# Patient Record
Sex: FEMALE | Race: BLACK OR AFRICAN AMERICAN | Employment: FULL TIME | URBAN - METROPOLITAN AREA
[De-identification: names, ages, dates, MRNs, and addresses within clinical notes are randomized per-mention and may not be internally consistent; named-entity substitution may affect disease eponyms.]

---

## 2021-07-10 ENCOUNTER — HOSPITAL ENCOUNTER (EMERGENCY)
Age: 26
Discharge: HOME | End: 2021-07-10
Attending: EMERGENCY MEDICINE
Payer: COMMERCIAL

## 2021-07-10 ENCOUNTER — APPOINTMENT (OUTPATIENT)
Dept: GENERAL RADIOLOGY | Age: 26
End: 2021-07-10
Payer: COMMERCIAL

## 2021-07-10 VITALS
SYSTOLIC BLOOD PRESSURE: 127 MMHG | RESPIRATION RATE: 20 BRPM | HEART RATE: 74 BPM | HEIGHT: 65 IN | BODY MASS INDEX: 25.16 KG/M2 | WEIGHT: 151 LBS | TEMPERATURE: 97.4 F | DIASTOLIC BLOOD PRESSURE: 80 MMHG | OXYGEN SATURATION: 100 %

## 2021-07-10 DIAGNOSIS — R11.2 NON-INTRACTABLE VOMITING WITH NAUSEA, UNSPECIFIED VOMITING TYPE: ICD-10-CM

## 2021-07-10 DIAGNOSIS — R07.89 CHEST WALL PAIN: Primary | ICD-10-CM

## 2021-07-10 LAB — HCG(URINE) PREGNANCY TEST: NEGATIVE

## 2021-07-10 PROCEDURE — 93005 ELECTROCARDIOGRAM TRACING: CPT | Performed by: EMERGENCY MEDICINE

## 2021-07-10 PROCEDURE — 84703 CHORIONIC GONADOTROPIN ASSAY: CPT

## 2021-07-10 PROCEDURE — 71046 X-RAY EXAM CHEST 2 VIEWS: CPT

## 2021-07-10 PROCEDURE — 6370000000 HC RX 637 (ALT 250 FOR IP): Performed by: STUDENT IN AN ORGANIZED HEALTH CARE EDUCATION/TRAINING PROGRAM

## 2021-07-10 PROCEDURE — 6360000002 HC RX W HCPCS

## 2021-07-10 PROCEDURE — 99285 EMERGENCY DEPT VISIT HI MDM: CPT

## 2021-07-10 PROCEDURE — 96374 THER/PROPH/DIAG INJ IV PUSH: CPT

## 2021-07-10 RX ORDER — KETOROLAC TROMETHAMINE 30 MG/ML
30 INJECTION, SOLUTION INTRAMUSCULAR; INTRAVENOUS ONCE
Status: COMPLETED | OUTPATIENT
Start: 2021-07-10 | End: 2021-07-10

## 2021-07-10 RX ORDER — NAPROXEN 250 MG/1
250 TABLET ORAL 2 TIMES DAILY WITH MEALS
Qty: 20 TABLET | Refills: 0 | Status: SHIPPED | OUTPATIENT
Start: 2021-07-10

## 2021-07-10 RX ORDER — ONDANSETRON 4 MG/1
4 TABLET, ORALLY DISINTEGRATING ORAL EVERY 8 HOURS PRN
Qty: 12 TABLET | Refills: 0 | Status: SHIPPED | OUTPATIENT
Start: 2021-07-10 | End: 2021-07-13 | Stop reason: ALTCHOICE

## 2021-07-10 RX ORDER — KETOROLAC TROMETHAMINE 30 MG/ML
INJECTION, SOLUTION INTRAMUSCULAR; INTRAVENOUS
Status: COMPLETED
Start: 2021-07-10 | End: 2021-07-10

## 2021-07-10 RX ORDER — ONDANSETRON 4 MG/1
4 TABLET, ORALLY DISINTEGRATING ORAL ONCE
Status: COMPLETED | OUTPATIENT
Start: 2021-07-10 | End: 2021-07-10

## 2021-07-10 RX ADMIN — ONDANSETRON 4 MG: 4 TABLET, ORALLY DISINTEGRATING ORAL at 10:51

## 2021-07-10 RX ADMIN — KETOROLAC TROMETHAMINE 30 MG: 30 INJECTION, SOLUTION INTRAMUSCULAR; INTRAVENOUS at 10:51

## 2021-07-10 ASSESSMENT — PAIN DESCRIPTION - LOCATION: LOCATION: CHEST

## 2021-07-10 ASSESSMENT — PAIN DESCRIPTION - ORIENTATION: ORIENTATION: LEFT

## 2021-07-10 ASSESSMENT — PAIN SCALES - GENERAL
PAINLEVEL_OUTOF10: 9
PAINLEVEL_OUTOF10: 4

## 2021-07-10 ASSESSMENT — PAIN DESCRIPTION - PAIN TYPE: TYPE: ACUTE PAIN

## 2021-07-10 ASSESSMENT — PAIN DESCRIPTION - DESCRIPTORS: DESCRIPTORS: SHARP

## 2021-07-10 NOTE — ED PROVIDER NOTES
I have personally performed a face to face diagnostic evaluation on this patient. I have fully participated in the care of this patient. I have reviewed and agree with all pertinent clinical information including history, physical exam, diagnostic tests, and the plan. This patient was seen with resident physician Dr. Pamella Morley. History and Physical as follows:  61-year-old female states she woke up and while brushing her teeth had some nausea with vomiting. She states she had a total of about 6 episodes. Despite her symptoms she went to work and stated she vomited in route. While there she developed some left-sided chest pain described as worse with change in position and with breathing. No cough or URI symptoms. She states she had some diarrhea at work. No blood or bile in her emesis. Denies alcohol use last night but did smoke from a hookah. She called 911 from work and was brought here by [employer] who reported that she had stable vital signs. Patient has no previous history of abdominal surgery. Her last menstrual period was 7 days ago. No known risk factors for PE. She is afebrile with normal vital signs. Lungs are clear bilaterally. Heart is regular rate and rhythm without murmur gallop. She has left-sided chest wall tenderness which exactly mimics her pain. This is also mimicked by truncal movement. Abdomen is soft and nontender with normal bowel sounds. EKG read by myself shows normal sinus rhythm at a rate of 64. There is some baseline artifact. Axis is 62. No ischemia. No ectopy. QTC is 404. Patient was medicated with Toradol IM. She declines any medication at this time for nausea/diarrhea. UCG was negative. Chest x-ray read by the radiologist and reviewed by myself shows no acute cardiopulmonary findings. Clinically the patient's chest pain is musculoskeletal.  No evidence of pneumothorax, pneumonia, dissection or DVT/PE. Her abdomen is benign.   No clinical evidence for surgical abdomen, bowel obstruction, pancreatitis, pyelonephritis or ureteral colic. Recommended Zofran if needed for nausea/vomiting and Naprosyn if needed for chest pain. She was given a note for work today.     DX: 1) chest wall pain         2) nausea with vomiting          Sheryle Scotland, MD  07/10/21 7198

## 2021-07-10 NOTE — ED PROVIDER NOTES
Emergency Physician Note    Chief Complaint  Abdominal Pain (LUQ ) and Chest Pain (left upper with NV )       History of Present Illness  Briana Osuna is a 27 y.o. female who presents to the ED for L chest pain and NV with onset 5 hours ago. Pt states she was at work this morning when she called for an ambulance because of multiple episodes of nausea and vomiting. States she felt well until waking up this morning. Reports she went out with friends last night and smoked hookah, did NOT drink any EtOH. Felt fine when she went to sleep around 230am.  Woke to go to work and had nausea followed by vomiting, 6 total episodes of emesis, last of which was 3 hours ago. Patient is also experiencing pleuritic chest pain which began after vomiting today. She gets some relief when laying down and chest pain is worse with movement. Describes non radiating sharp, needle like left sided chest pain. Pain is reproducible to palpation. Also reports a couple episodes of diarrhea this morning, which have since resolved. Denies dysuria, fevers, history of similar    LNMP was 7 days ago    No known risk factors for PE    10 systems reviewed, pertinent positives per HPI otherwise noted to be negative      Nursing notes reviewed. ED Triage Vitals   Enc Vitals Group      BP       Pulse       Resp       Temp       Temp src       SpO2       Weight       Height       Head Circumference       Peak Flow       Pain Score       Pain Loc       Pain Edu? Excl. in [address]? GENERAL:  Awake, alert. Well developed, well nourished with no apparent distress. HENT:  Normocephalic, Atraumatic, moist mucous membranes. EYES:  Pupils equal round and reactive to light, Conjunctiva normal, extraocular movements normal.  NECK:  No meningeal signs, Supple. CHEST:  Regular rate and rhythm, chest wall point tenderness on Left T4 mid clavicular line. LUNGS:  Clear to auscultation bilaterally.   ABDOMEN:  Soft, non-tender, no rebound, rigidity or guarding, non-distended, normal bowel sounds. No costovertebral angle tenderness to palpation. BACK:  No tenderness. EXTREMITIES:  Normal range of motion, no edema, no bony tenderness, no deformity, distal pulses present. SKIN: Warm, dry and intact. NEUROLOGIC: Normal mental status. Moving all extremities to command. LABS and DIAGNOSTIC RESULTS  EKG  normal sinus rhythm at a rate of 64. There is some baseline artifact. Axis is 62. No ischemia. No ectopy. QTC is 404    RADIOLOGY  X-RAYS:  I have reviewed radiologic plain film image(s). ALL OTHER NON-PLAIN FILM IMAGES SUCH AS CT, ULTRASOUND AND MRI HAVE BEEN READ BY THE RADIOLOGIST. XR CHEST (2 VW)   Final Result      No acute cardiopulmonary findings. LABS  Labs Reviewed   PREGNANCY, URINE    Narrative:     Performed at:  [facility]  [address] [address],  [address]   Phone [phone]       [facility]        . I advised the patient to return to the emergency department immediately for any new or worsening symptoms, such as intractable nausea and vomiting, SOB, chest pain on exertion. The patient voiced agreement and understanding of the treatment plan. Patient was given scripts for the following medications. I counseled patient how to take these medications. Discharge Medication List as of 7/10/2021 11:40 AM      START taking these medications    Details   ondansetron (ZOFRAN ODT) 4 MG disintegrating tablet Place 1 tablet under the tongue every 8 hours as needed for Nausea or Vomiting, Disp-12 tablet, R-0Print      naproxen (NAPROSYN) 250 MG tablet Take 1 tablet by mouth 2 times daily (with meals), Disp-20 tablet, R-0Print             Disposition  Pt is in good condition upon Discharge to home. Clinical Impression  1. Chest wall pain    2.  Non-intractable vomiting with nausea, unspecified vomiting type

## 2021-07-11 LAB
EKG ATRIAL RATE: 59 BPM
EKG DIAGNOSIS: NORMAL
EKG P AXIS: 29 DEGREES
EKG P-R INTERVAL: 198 MS
EKG Q-T INTERVAL: 388 MS
EKG QRS DURATION: 78 MS
EKG QTC CALCULATION (BAZETT): 384 MS
EKG R AXIS: 61 DEGREES
EKG T AXIS: 49 DEGREES
EKG VENTRICULAR RATE: 59 BPM

## 2021-07-11 PROCEDURE — 93010 ELECTROCARDIOGRAM REPORT: CPT | Performed by: INTERNAL MEDICINE

## 2021-07-13 ENCOUNTER — HOSPITAL ENCOUNTER (EMERGENCY)
Age: 26
Discharge: HOME | End: 2021-07-13
Attending: EMERGENCY MEDICINE
Payer: COMMERCIAL

## 2021-07-13 ENCOUNTER — APPOINTMENT (OUTPATIENT)
Dept: CT IMAGING | Age: 26
End: 2021-07-13
Payer: COMMERCIAL

## 2021-07-13 VITALS
RESPIRATION RATE: 20 BRPM | OXYGEN SATURATION: 100 % | TEMPERATURE: 98.5 F | SYSTOLIC BLOOD PRESSURE: 108 MMHG | WEIGHT: 143 LBS | DIASTOLIC BLOOD PRESSURE: 70 MMHG | HEIGHT: 65 IN | HEART RATE: 85 BPM | BODY MASS INDEX: 23.82 KG/M2

## 2021-07-13 DIAGNOSIS — K52.9 ENTERITIS: Primary | ICD-10-CM

## 2021-07-13 DIAGNOSIS — N30.00 ACUTE CYSTITIS WITHOUT HEMATURIA: ICD-10-CM

## 2021-07-13 LAB
A/G RATIO: 1.1 (ref 1.1–2.2)
ALBUMIN SERPL-MCNC: 4.6 G/DL (ref 3.4–5)
ALP BLD-CCNC: 72 U/L (ref 40–129)
ALT SERPL-CCNC: 16 U/L (ref 10–40)
ANION GAP SERPL CALCULATED.3IONS-SCNC: 14 MMOL/L (ref 3–16)
AST SERPL-CCNC: 20 U/L (ref 15–37)
BACTERIA: ABNORMAL /HPF
BASOPHILS ABSOLUTE: 0 K/UL (ref 0–0.2)
BASOPHILS RELATIVE PERCENT: 1.1 %
BILIRUB SERPL-MCNC: 0.5 MG/DL (ref 0–1)
BILIRUBIN URINE: NEGATIVE
BLOOD, URINE: ABNORMAL
BUN BLDV-MCNC: 9 MG/DL (ref 7–20)
CALCIUM SERPL-MCNC: 9.3 MG/DL (ref 8.3–10.6)
CHLORIDE BLD-SCNC: 100 MMOL/L (ref 99–110)
CLARITY: ABNORMAL
CO2: 26 MMOL/L (ref 21–32)
COLOR: YELLOW
CREAT SERPL-MCNC: 0.8 MG/DL (ref 0.6–1.1)
EOSINOPHILS ABSOLUTE: 0 K/UL (ref 0–0.6)
EOSINOPHILS RELATIVE PERCENT: 1 %
EPITHELIAL CELLS, UA: ABNORMAL /HPF (ref 0–5)
GFR AFRICAN AMERICAN: >60
GFR NON-AFRICAN AMERICAN: >60
GLOBULIN: 4.1 G/DL
GLUCOSE BLD-MCNC: 98 MG/DL (ref 70–99)
GLUCOSE URINE: NEGATIVE MG/DL
HCG(URINE) PREGNANCY TEST: NEGATIVE
HCT VFR BLD CALC: 39.6 % (ref 36–48)
HEMOGLOBIN: 13.1 G/DL (ref 12–16)
KETONES, URINE: NEGATIVE MG/DL
LEUKOCYTE ESTERASE, URINE: ABNORMAL
LIPASE: 22 U/L (ref 13–60)
LYMPHOCYTES ABSOLUTE: 1.1 K/UL (ref 1–5.1)
LYMPHOCYTES RELATIVE PERCENT: 27.2 %
MCH RBC QN AUTO: 28.9 PG (ref 26–34)
MCHC RBC AUTO-ENTMCNC: 33.1 G/DL (ref 31–36)
MCV RBC AUTO: 87.2 FL (ref 80–100)
MICROSCOPIC EXAMINATION: YES
MONOCYTES ABSOLUTE: 0.7 K/UL (ref 0–1.3)
MONOCYTES RELATIVE PERCENT: 17 %
NEUTROPHILS ABSOLUTE: 2.2 K/UL (ref 1.7–7.7)
NEUTROPHILS RELATIVE PERCENT: 53.7 %
NITRITE, URINE: POSITIVE
PDW BLD-RTO: 14.4 % (ref 12.4–15.4)
PH UA: 6.5 (ref 5–8)
PLATELET # BLD: 278 K/UL (ref 135–450)
PMV BLD AUTO: 8.3 FL (ref 5–10.5)
POTASSIUM REFLEX MAGNESIUM: 3.6 MMOL/L (ref 3.5–5.1)
PROTEIN UA: 30 MG/DL
RBC # BLD: 4.55 M/UL (ref 4–5.2)
RBC UA: ABNORMAL /HPF (ref 0–4)
SODIUM BLD-SCNC: 140 MMOL/L (ref 136–145)
SPECIFIC GRAVITY UA: <=1.005 (ref 1–1.03)
TOTAL PROTEIN: 8.7 G/DL (ref 6.4–8.2)
URINE REFLEX TO CULTURE: ABNORMAL
URINE TYPE: ABNORMAL
UROBILINOGEN, URINE: 0.2 E.U./DL
WBC # BLD: 4.2 K/UL (ref 4–11)
WBC UA: ABNORMAL /HPF (ref 0–5)

## 2021-07-13 PROCEDURE — 85025 COMPLETE CBC W/AUTO DIFF WBC: CPT

## 2021-07-13 PROCEDURE — 81001 URINALYSIS AUTO W/SCOPE: CPT

## 2021-07-13 PROCEDURE — 6360000004 HC RX CONTRAST MEDICATION: Performed by: EMERGENCY MEDICINE

## 2021-07-13 PROCEDURE — 74177 CT ABD & PELVIS W/CONTRAST: CPT

## 2021-07-13 PROCEDURE — 6360000002 HC RX W HCPCS: Performed by: EMERGENCY MEDICINE

## 2021-07-13 PROCEDURE — 2580000003 HC RX 258: Performed by: EMERGENCY MEDICINE

## 2021-07-13 PROCEDURE — 96374 THER/PROPH/DIAG INJ IV PUSH: CPT

## 2021-07-13 PROCEDURE — 84703 CHORIONIC GONADOTROPIN ASSAY: CPT

## 2021-07-13 PROCEDURE — 6370000000 HC RX 637 (ALT 250 FOR IP): Performed by: EMERGENCY MEDICINE

## 2021-07-13 PROCEDURE — 99283 EMERGENCY DEPT VISIT LOW MDM: CPT

## 2021-07-13 PROCEDURE — 83690 ASSAY OF LIPASE: CPT

## 2021-07-13 PROCEDURE — 80053 COMPREHEN METABOLIC PANEL: CPT

## 2021-07-13 RX ORDER — CIPROFLOXACIN 2 MG/ML
400 INJECTION, SOLUTION INTRAVENOUS ONCE
Status: DISCONTINUED | OUTPATIENT
Start: 2021-07-13 | End: 2021-07-13 | Stop reason: HOSPADM

## 2021-07-13 RX ORDER — METRONIDAZOLE 500 MG/1
500 TABLET ORAL 2 TIMES DAILY
Qty: 20 TABLET | Refills: 0 | Status: SHIPPED | OUTPATIENT
Start: 2021-07-13 | End: 2021-07-23

## 2021-07-13 RX ORDER — ONDANSETRON 2 MG/ML
4 INJECTION INTRAMUSCULAR; INTRAVENOUS ONCE
Status: COMPLETED | OUTPATIENT
Start: 2021-07-13 | End: 2021-07-13

## 2021-07-13 RX ORDER — ONDANSETRON 4 MG/1
4 TABLET, ORALLY DISINTEGRATING ORAL EVERY 8 HOURS PRN
Qty: 10 TABLET | Refills: 0 | Status: SHIPPED | OUTPATIENT
Start: 2021-07-13

## 2021-07-13 RX ORDER — METRONIDAZOLE 500 MG/1
500 TABLET ORAL ONCE
Status: COMPLETED | OUTPATIENT
Start: 2021-07-13 | End: 2021-07-13

## 2021-07-13 RX ORDER — 0.9 % SODIUM CHLORIDE 0.9 %
1000 INTRAVENOUS SOLUTION INTRAVENOUS ONCE
Status: COMPLETED | OUTPATIENT
Start: 2021-07-13 | End: 2021-07-13

## 2021-07-13 RX ORDER — DICYCLOMINE HYDROCHLORIDE 10 MG/1
10 CAPSULE ORAL EVERY 6 HOURS PRN
Qty: 20 CAPSULE | Refills: 0 | Status: SHIPPED | OUTPATIENT
Start: 2021-07-13

## 2021-07-13 RX ORDER — CIPROFLOXACIN 500 MG/1
500 TABLET, FILM COATED ORAL 2 TIMES DAILY
Qty: 20 TABLET | Refills: 0 | Status: SHIPPED | OUTPATIENT
Start: 2021-07-13 | End: 2021-07-23

## 2021-07-13 RX ORDER — CIPROFLOXACIN 500 MG/1
500 TABLET, FILM COATED ORAL EVERY 12 HOURS SCHEDULED
Status: DISCONTINUED | OUTPATIENT
Start: 2021-07-13 | End: 2021-07-13 | Stop reason: HOSPADM

## 2021-07-13 RX ORDER — ONDANSETRON 2 MG/ML
4 INJECTION INTRAMUSCULAR; INTRAVENOUS ONCE
Status: DISCONTINUED | OUTPATIENT
Start: 2021-07-13 | End: 2021-07-13 | Stop reason: HOSPADM

## 2021-07-13 RX ORDER — 0.9 % SODIUM CHLORIDE 0.9 %
1000 INTRAVENOUS SOLUTION INTRAVENOUS ONCE
Status: DISCONTINUED | OUTPATIENT
Start: 2021-07-13 | End: 2021-07-13 | Stop reason: HOSPADM

## 2021-07-13 RX ADMIN — CIPROFLOXACIN HYDROCHLORIDE 500 MG: 500 TABLET, FILM COATED ORAL at 12:55

## 2021-07-13 RX ADMIN — METRONIDAZOLE 500 MG: 500 TABLET ORAL at 12:55

## 2021-07-13 RX ADMIN — SODIUM CHLORIDE 1000 ML: 9 INJECTION, SOLUTION INTRAVENOUS at 10:37

## 2021-07-13 RX ADMIN — IOPAMIDOL 80 ML: 755 INJECTION, SOLUTION INTRAVENOUS at 11:29

## 2021-07-13 RX ADMIN — ONDANSETRON 4 MG: 2 INJECTION INTRAMUSCULAR; INTRAVENOUS at 10:35

## 2021-07-13 ASSESSMENT — ENCOUNTER SYMPTOMS
COLOR CHANGE: 0
VOMITING: 1
SHORTNESS OF BREATH: 0
VOICE CHANGE: 0
DIARRHEA: 1
TROUBLE SWALLOWING: 0
NAUSEA: 1
WHEEZING: 0
STRIDOR: 0
ABDOMINAL PAIN: 1
FACIAL SWELLING: 0

## 2021-07-13 ASSESSMENT — PAIN SCALES - GENERAL: PAINLEVEL_OUTOF10: 5

## 2021-07-13 ASSESSMENT — PAIN DESCRIPTION - DESCRIPTORS: DESCRIPTORS: OTHER (COMMENT)

## 2021-07-13 ASSESSMENT — PAIN DESCRIPTION - ORIENTATION: ORIENTATION: UPPER;LEFT;RIGHT

## 2021-07-13 ASSESSMENT — PAIN DESCRIPTION - LOCATION: LOCATION: ABDOMEN

## 2021-07-13 ASSESSMENT — PAIN DESCRIPTION - PAIN TYPE: TYPE: ACUTE PAIN

## 2021-07-13 NOTE — ED NOTES
Pt states she can take PO antibiotics. Reviewed with Dr. Arben Aguilar. See orders.      Nilam Gibbs RN  07/13/21 3674

## 2021-07-13 NOTE — ED PROVIDER NOTES
[address]  eMERGENCY dEPARTMENT eNCOUnter      Pt Name: Jose Burnette  MRN: [id number]  [place] 1995  Date of evaluation: 7/13/2021  Provider: Forest Rodríguez MD    [address] [place] [address]       Chief Complaint   Patient presents with    Abdominal Pain         HISTORY OF PRESENT ILLNESS   (Location/Symptom, Timing/Onset, Context/Setting, Quality, Duration, Modifying Factors, Severity)  Note limiting factors. Conception Vasile is a 27 y.o. female who presents with nausea, vomiting, diarrhea, and diffuse abdominal pain for the past 3 days. Of note the patient was here 3 days ago with similar symptoms as well as chest pain from vomiting had a negative urine pregnancy test and negative x-ray. Reports her chest pain is completely resolved however her nausea vomiting diarrhea has continued and now she has abdominal pain. She has any vaginal bleeding, vaginal discharge, or dysuria. She reports her chest pain is diffuse, aching, and in all 4 abdominal quadrants and gets worse with eating. She denies any fever. She denies any constipation. HPI    Nursing Notes were reviewed. REVIEW OFSYSTEMS    (2-9 systems for level 4, 10 or more for level 5)     Review of Systems   Constitutional: Negative for appetite change, fever and unexpected weight change. HENT: Negative for facial swelling, trouble swallowing and voice change. Eyes: Negative for visual disturbance. Respiratory: Negative for shortness of breath, wheezing and stridor. Cardiovascular: Negative for chest pain and palpitations. Gastrointestinal: Positive for abdominal pain, diarrhea, nausea and vomiting. Genitourinary: Negative for dysuria and vaginal bleeding. Musculoskeletal: Negative for neck pain and neck stiffness. Skin: Negative for color change and wound. Neurological: Negative for seizures and syncope. Psychiatric/Behavioral: Negative for self-injury and suicidal ideas.        Except as noted above the remainder of the review of systems was reviewed and negative. PAST MEDICAL HISTORY   History reviewed. No pertinent past medical history. SURGICAL HISTORY     History reviewed. No pertinent surgical history. CURRENT MEDICATIONS       Previous Medications    NAPROXEN (NAPROSYN) 250 MG TABLET    Take 1 tablet by mouth 2 times daily (with meals)       ALLERGIES     Patient has no known allergies. FAMILY HISTORY     History reviewed. No pertinent family history. SOCIAL HISTORY       Social History     Socioeconomic History    Marital status: Single     Spouse name: None    Number of children: None    Years of education: None    Highest education level: None   Occupational History    None   Tobacco Use    Smoking status: Never Smoker    Smokeless tobacco: Never Used   Substance and Sexual Activity    Alcohol use: Not Currently    Drug use: Not Currently    Sexual activity: None   Other Topics Concern    None   Social History Narrative    None     Social Determinants of Health     Financial Resource Strain:     Difficulty of Paying Living Expenses:    Food Insecurity:     Worried About Running Out of Food in the Last Year:     Ran Out of Food in the Last Year:    Transportation Needs:     Lack of Transportation (Medical):      Lack of Transportation (Non-Medical):    Physical Activity:     Days of Exercise per Week:     Minutes of Exercise per Session:    Stress:     Feeling of Stress :    Social Connections:     Frequency of Communication with Friends and Family:     Frequency of Social Gatherings with Friends and Family:     Attends Hindu Services:     Active Member of Clubs or Organizations:     Attends Club or Organization Meetings:     Marital Status:    Intimate Partner Violence:     Fear of Current or Ex-Partner:     Emotionally Abused:     Physically Abused:     Sexually Abused:          PHYSICAL EXAM    (up to 7 for level 4, 8 or more for level 5)     ED Triage Vitals [07/13/21 0942]   BP Temp Temp Source Pulse Resp SpO2 Height Weight   106/63 98 °F (36.7 °C) Oral 73 14 98 % 5' 5\" (1.651 m) 143 lb (64.9 kg)       Physical Exam  Vitals and nursing note reviewed. Constitutional:       Appearance: She is well-developed. She is not diaphoretic. Comments: Pleasant and cooperative. Nontoxic-appearing. In no acute distress. HENT:      Head: Normocephalic and atraumatic. Right Ear: External ear normal.      Left Ear: External ear normal.   Eyes:      Conjunctiva/sclera: Conjunctivae normal.   Neck:      Vascular: No JVD. Trachea: No tracheal deviation. Cardiovascular:      Rate and Rhythm: Normal rate. Pulmonary:      Effort: Pulmonary effort is normal. No respiratory distress. Breath sounds: Normal breath sounds. No wheezing. Abdominal:      General: There is no distension. Palpations: Abdomen is soft. Tenderness: There is abdominal tenderness. There is no guarding or rebound. Comments: Mild diffuse tenderness to all 4 abdominal quadrants. No rebound, guarding, peritoneal signs. Musculoskeletal:         General: No tenderness or deformity. Normal range of motion. Cervical back: Neck supple. Skin:     General: Skin is warm and dry. Neurological:      Mental Status: She is alert. Cranial Nerves: No cranial nerve deficit. DIAGNOSTIC RESULTS         RADIOLOGY:     Interpretation per the Radiologist below, if available at the time of this note:    CT ABDOMEN PELVIS W IV CONTRAST Additional Contrast? None   Final Result      1. Nonspecific wall thickening in the terminal ileum which may suggest infectious or inflammatory enteritis. Findings could represent regional enteritis the proper clinical setting. 2. Normal appendix in the right lower quadrant. No acute abdominal or pelvic abnormality otherwise identified.                   ED BEDSIDE ULTRASOUND:   Performed by ED Physician - none    LABS:  Labs Reviewed   COMPREHENSIVE METABOLIC PANEL W/ REFLEX TO MG FOR LOW K - Abnormal; Notable for the following components:       Result Value    Total Protein 8.7 (*)     All other components within normal limits    Narrative:     Performed at:  On license of [facility]  [facility] [address] [facility]   Phone [phone]   URINE RT REFLEX TO CULTURE - Abnormal; Notable for the following components:    Clarity, UA SL CLOUDY (*)     Blood, Urine LARGE (*)     Protein, UA 30 (*)     Nitrite, Urine POSITIVE (*)     Leukocyte Esterase, Urine TRACE (*)     All other components within normal limits    Narrative:     Performed at:  On license of [facility]  [address]  [facility] [place]   Phone [phone]   MICROSCOPIC URINALYSIS - Abnormal; Notable for the following components:    Bacteria, UA 4+ (*)     All other components within normal limits    Narrative:     Performed at:  On license of [facility]  [facility] [facility] [place]   Phone [phone]   CBC WITH AUTO DIFFERENTIAL    Narrative:     Performed at:  On license of [facility]  [facility] [place]   Phone [phone]   LIPASE    Narrative:     Performed at:  [facility]  [address] [facility] [place]   Phone [phone]   PREGNANCY, URINE    Narrative:     Performed at:  On license of [facility]  [facility] [place]   Phone [phone]       All otherlabs were within normal range or not returned as of this dictation.     EMERGENCY DEPARTMENT COURSE and DIFFERENTIAL DIAGNOSIS/MDM:   Vitals:    Vitals:    07/13/21 0942   BP: 106/63   Pulse: 73   Resp: 14   Temp: 98 °F (36.7 °C)   TempSrc: Oral   SpO2: 98%   Weight: 143 lb (64.9 kg)   Height: 5' 5\" (1.651 m)         MDM  Work-up shows concerns of enteritis with inflammation of the terminal ileum as well as acute cystitis. Patient is otherwise unremarkable with no pamela signs of peritonitis, normal laboratory evaluation, and all normal vital signs. She is tolerating p.o. well by time she leaves the emergency department. Feel she is appropriate for antibiotics, Bentyl for pain, and Zofran for nausea as well as gastroenterology referral and primary care referral.  Strict ER return precautions given for new or worsening symptoms. I discussed this plan with the patient and her mother who both expressed understanding and agreement with this plan. The risks vs benefits of treating patient with fluoroquinolones vs other alternative therapies are considered and discussed with the patient. Namely the risks of tendon rupture and peripheral neuropathy are discussed with the patient as well as any concomitant risk factors for these processes. After discussing these risks a shared decision is made with the patient that this is the most appropriate antibiotic for their condition and that we will proceed with this treatment. The patient is advised to avoid high impact exercises for the next 1-2 weeks but is made aware this will reduce but not eliminate the increased risk of tendon rupture. The patient voices understanding of these risks and agreement to proceed with therapy. I estimate there is low risk for acute appendicitis, bowl obstruction, cholecystitis, diverticulitis, incarcerated hernia, mesenteric ischemia, pancreatitis, perforated bowl or ulcer, or abdominal aortic aneurism, thus I consider the discharge disposition reasonable. Also, there is no evidence or peritonitis, sepsis or toxicity. We have discussed the diagnosis and risks and agree with discharging home to follow-up with their primary doctor.  We also discussed returning to the Emergency Department immediately if new or worsening symptoms occur and have discussed the symptoms which are most concerning (e.g., bloody stool, fever, changing or worsening pain, vomiting) that necessitate immediate return. Procedures    FINAL IMPRESSION      1. Enteritis    2. Acute cystitis without hematuria          DISPOSITION/PLAN   DISPOSITION  Discharge      PATIENT REFERRED TO:  Pat Wilkerson MD  [address] [facility], [address]  [phone]    In 1 week  For inflamation of the terminal [facility]  [address] [facility] [address]  [phone]    In 1 week  Ask for appointment with Primary Care Doctor    [address] Emergency Department  [address] [place] [address]  [phone]    If symptoms worsen      DISCHARGE MEDICATIONS:  New Prescriptions    CIPROFLOXACIN (CIPRO) 500 MG TABLET    Take 1 tablet by mouth 2 times daily for 10 days    DICYCLOMINE (BENTYL) 10 MG CAPSULE    Take 1 capsule by mouth every 6 hours as needed (cramps)    METRONIDAZOLE (FLAGYL) 500 MG TABLET    Take 1 tablet by mouth 2 times daily for 10 days    ONDANSETRON (ZOFRAN ODT) 4 MG DISINTEGRATING TABLET    Take 1 tablet by mouth every 8 hours as needed for Nausea          (Please note that portions of this note were completed with a voice recognition program.  Efforts were made to edit the dictations but occasionally words aremis-transcribed. )    Melissa Aguilar MD (electronically signed)  Attending Emergency Physician            Melissa Aguilar MD  07/13/21 3065

## 2022-11-09 ENCOUNTER — HOSPITAL ENCOUNTER (EMERGENCY)
Age: 27
Discharge: HOME | End: 2022-11-09
Payer: COMMERCIAL

## 2022-11-09 VITALS
HEART RATE: 79 BPM | DIASTOLIC BLOOD PRESSURE: 68 MMHG | RESPIRATION RATE: 16 BRPM | OXYGEN SATURATION: 98 % | WEIGHT: 135 LBS | BODY MASS INDEX: 22.47 KG/M2 | TEMPERATURE: 99.6 F | SYSTOLIC BLOOD PRESSURE: 109 MMHG

## 2022-11-09 DIAGNOSIS — R11.2 NAUSEA AND VOMITING, UNSPECIFIED VOMITING TYPE: ICD-10-CM

## 2022-11-09 DIAGNOSIS — U07.1 COVID: Primary | ICD-10-CM

## 2022-11-09 DIAGNOSIS — E86.0 DEHYDRATION: ICD-10-CM

## 2022-11-09 LAB
A/G RATIO: 1.1 (ref 1.1–2.2)
ALBUMIN SERPL-MCNC: 4.3 G/DL (ref 3.4–5)
ALP BLD-CCNC: 62 U/L (ref 40–129)
ALT SERPL-CCNC: 12 U/L (ref 10–40)
ANION GAP SERPL CALCULATED.3IONS-SCNC: 13 MMOL/L (ref 3–16)
AST SERPL-CCNC: 22 U/L (ref 15–37)
BASOPHILS ABSOLUTE: 0 K/UL (ref 0–0.2)
BASOPHILS RELATIVE PERCENT: 0.1 %
BILIRUB SERPL-MCNC: <0.2 MG/DL (ref 0–1)
BILIRUBIN URINE: NEGATIVE
BLOOD, URINE: NEGATIVE
BUN BLDV-MCNC: 5 MG/DL (ref 7–20)
CALCIUM SERPL-MCNC: 9.2 MG/DL (ref 8.3–10.6)
CHLORIDE BLD-SCNC: 100 MMOL/L (ref 99–110)
CLARITY: CLEAR
CO2: 23 MMOL/L (ref 21–32)
COLOR: YELLOW
CREAT SERPL-MCNC: 0.7 MG/DL (ref 0.6–1.1)
EOSINOPHILS ABSOLUTE: 0 K/UL (ref 0–0.6)
EOSINOPHILS RELATIVE PERCENT: 0.5 %
GFR SERPL CREATININE-BSD FRML MDRD: >60 ML/MIN/{1.73_M2}
GLUCOSE BLD-MCNC: 94 MG/DL (ref 70–99)
GLUCOSE URINE: NEGATIVE MG/DL
HCT VFR BLD CALC: 37.1 % (ref 36–48)
HEMOGLOBIN: 12.4 G/DL (ref 12–16)
KETONES, URINE: 40 MG/DL
LEUKOCYTE ESTERASE, URINE: NEGATIVE
LIPASE: 20 U/L (ref 13–60)
LYMPHOCYTES ABSOLUTE: 0.5 K/UL (ref 1–5.1)
LYMPHOCYTES RELATIVE PERCENT: 9.1 %
MCH RBC QN AUTO: 28.9 PG (ref 26–34)
MCHC RBC AUTO-ENTMCNC: 33.5 G/DL (ref 31–36)
MCV RBC AUTO: 86.2 FL (ref 80–100)
MICROSCOPIC EXAMINATION: ABNORMAL
MONOCYTES ABSOLUTE: 0.7 K/UL (ref 0–1.3)
MONOCYTES RELATIVE PERCENT: 13.8 %
NEUTROPHILS ABSOLUTE: 3.8 K/UL (ref 1.7–7.7)
NEUTROPHILS RELATIVE PERCENT: 76.5 %
NITRITE, URINE: NEGATIVE
PDW BLD-RTO: 13 % (ref 12.4–15.4)
PH UA: 6 (ref 5–8)
PLATELET # BLD: 242 K/UL (ref 135–450)
PMV BLD AUTO: 8.3 FL (ref 5–10.5)
POTASSIUM REFLEX MAGNESIUM: 4.3 MMOL/L (ref 3.5–5.1)
PROTEIN UA: NEGATIVE MG/DL
RAPID INFLUENZA  B AGN: NEGATIVE
RAPID INFLUENZA A AGN: NEGATIVE
RBC # BLD: 4.3 M/UL (ref 4–5.2)
SARS-COV-2, NAAT: DETECTED
SODIUM BLD-SCNC: 136 MMOL/L (ref 136–145)
SPECIFIC GRAVITY UA: <=1.005 (ref 1–1.03)
TOTAL PROTEIN: 8.1 G/DL (ref 6.4–8.2)
URINE REFLEX TO CULTURE: ABNORMAL
URINE TYPE: ABNORMAL
UROBILINOGEN, URINE: 0.2 E.U./DL
WBC # BLD: 5 K/UL (ref 4–11)

## 2022-11-09 PROCEDURE — 87804 INFLUENZA ASSAY W/OPTIC: CPT

## 2022-11-09 PROCEDURE — 99284 EMERGENCY DEPT VISIT MOD MDM: CPT

## 2022-11-09 PROCEDURE — 83690 ASSAY OF LIPASE: CPT

## 2022-11-09 PROCEDURE — 87635 SARS-COV-2 COVID-19 AMP PRB: CPT

## 2022-11-09 PROCEDURE — 2580000003 HC RX 258: Performed by: PHYSICIAN ASSISTANT

## 2022-11-09 PROCEDURE — 81003 URINALYSIS AUTO W/O SCOPE: CPT

## 2022-11-09 PROCEDURE — 85025 COMPLETE CBC W/AUTO DIFF WBC: CPT

## 2022-11-09 PROCEDURE — 6360000002 HC RX W HCPCS: Performed by: PHYSICIAN ASSISTANT

## 2022-11-09 PROCEDURE — 80053 COMPREHEN METABOLIC PANEL: CPT

## 2022-11-09 PROCEDURE — 96375 TX/PRO/DX INJ NEW DRUG ADDON: CPT

## 2022-11-09 PROCEDURE — 96374 THER/PROPH/DIAG INJ IV PUSH: CPT

## 2022-11-09 PROCEDURE — 96361 HYDRATE IV INFUSION ADD-ON: CPT

## 2022-11-09 RX ORDER — ONDANSETRON 4 MG/1
4 TABLET, ORALLY DISINTEGRATING ORAL EVERY 8 HOURS PRN
Qty: 24 TABLET | Refills: 0 | Status: SHIPPED | OUTPATIENT
Start: 2022-11-09 | End: 2022-11-16

## 2022-11-09 RX ORDER — IBUPROFEN 800 MG/1
800 TABLET ORAL EVERY 8 HOURS PRN
Qty: 20 TABLET | Refills: 0 | Status: SHIPPED | OUTPATIENT
Start: 2022-11-09 | End: 2022-11-19

## 2022-11-09 RX ORDER — ONDANSETRON 2 MG/ML
4 INJECTION INTRAMUSCULAR; INTRAVENOUS EVERY 30 MIN PRN
Status: DISCONTINUED | OUTPATIENT
Start: 2022-11-09 | End: 2022-11-09 | Stop reason: HOSPADM

## 2022-11-09 RX ORDER — KETOROLAC TROMETHAMINE 15 MG/ML
15 INJECTION, SOLUTION INTRAMUSCULAR; INTRAVENOUS ONCE
Status: COMPLETED | OUTPATIENT
Start: 2022-11-09 | End: 2022-11-09

## 2022-11-09 RX ORDER — 0.9 % SODIUM CHLORIDE 0.9 %
1000 INTRAVENOUS SOLUTION INTRAVENOUS ONCE
Status: COMPLETED | OUTPATIENT
Start: 2022-11-09 | End: 2022-11-09

## 2022-11-09 RX ADMIN — KETOROLAC TROMETHAMINE 15 MG: 15 INJECTION, SOLUTION INTRAMUSCULAR; INTRAVENOUS at 17:54

## 2022-11-09 RX ADMIN — ONDANSETRON 4 MG: 2 INJECTION INTRAMUSCULAR; INTRAVENOUS at 17:54

## 2022-11-09 RX ADMIN — SODIUM CHLORIDE 1000 ML: 9 INJECTION, SOLUTION INTRAVENOUS at 17:54

## 2022-11-09 ASSESSMENT — ENCOUNTER SYMPTOMS
CONSTIPATION: 0
COUGH: 1
EYE PAIN: 0
RHINORRHEA: 0
SORE THROAT: 1
BACK PAIN: 1
VOMITING: 1
SHORTNESS OF BREATH: 0
NAUSEA: 1
DIARRHEA: 0
ABDOMINAL PAIN: 0

## 2022-11-09 ASSESSMENT — PAIN - FUNCTIONAL ASSESSMENT: PAIN_FUNCTIONAL_ASSESSMENT: 0-10

## 2022-11-09 NOTE — Clinical Note
Sammy Hays was seen and treated in our emergency department on 11/9/2022. She may return to work on 11/14/2022. If you have any questions or concerns, please don't hesitate to call.       Kami Bradford PA-C

## 2022-11-10 NOTE — ED PROVIDER NOTES
[address]        Pt Name: Aryan Rodrigues  MRN: [id number]  [place] 1995  Date of evaluation: 11/9/2022  Provider: Chris Slaughter PA-C  PCP: No primary care provider on file. Note Started: 8:01 PM EST11/9/2022       LANEY. I have evaluated this patient. My supervising physician was available for consultation. Triage CHIEF COMPLAINT       Chief Complaint   Patient presents with    Emesis     Pt c/o vomiting, body aches, sore throat since today. Pt lying on abdomen and would not roll over in bed so vitals could be taken. Will try again later. HISTORY OF PRESENT ILLNESS   (Location/Symptom, Timing/Onset, Context/Setting, Quality, Duration, Modifying Factors, Severity)  Note limiting factors. Chief Complaint: Generalized body aches, headache, back pain, sore throat    Aryan Rodrigues is a 32 y.o. female who presents to the emergency department stating that she woke up, she stood up and she felt body aches very severe. This caused her to miss work, she admits to some chest wall pain, cough, sore throat, nausea, vomiting and some back pain. Nothing seems to make her feel completely better. She rates her discomfort level as severe. She denies any lightheadedness or dizziness. Nursing Notes were all reviewed and agreed with or any disagreements were addressed in the HPI. REVIEW OF SYSTEMS    (2-9 systems for level 4, 10 or more for level 5)     Review of Systems   Constitutional:  Positive for diaphoresis. Negative for chills and fever. HENT:  Positive for congestion and sore throat. Negative for ear pain and rhinorrhea. Eyes:  Negative for pain and visual disturbance. Respiratory:  Positive for cough. Negative for shortness of breath. Cardiovascular:  Positive for chest pain. Negative for palpitations and leg swelling. Gastrointestinal:  Positive for nausea and vomiting.  Negative for abdominal pain, constipation and diarrhea. Genitourinary:  Negative for decreased urine volume, dysuria, frequency and urgency. Musculoskeletal:  Positive for back pain. Negative for neck pain. Skin:  Negative for rash and wound. Neurological:  Negative for dizziness and light-headedness. PAST MEDICAL HISTORY   History reviewed. No pertinent past medical history. SURGICAL HISTORY   History reviewed. No pertinent surgical history. [address]       Discharge Medication List as of 11/9/2022  6:46 PM        CONTINUE these medications which have NOT CHANGED    Details   dicyclomine (BENTYL) 10 MG capsule Take 1 capsule by mouth every 6 hours as needed (cramps), Disp-20 capsule, R-0Print             ALLERGIES     Patient has no known allergies. FAMILYHISTORY     History reviewed. No pertinent family history. SOCIAL HISTORY       Social History     Socioeconomic History    Marital status: Single     Spouse name: None    Number of children: None    Years of education: None    Highest education level: None   Tobacco Use    Smoking status: Never    Smokeless tobacco: Never   Substance and Sexual Activity    Alcohol use: Not Currently    Drug use: Not Currently       SCREENINGS             PHYSICAL EXAM    (up to 7 for level 4, 8 or more for level 5)     ED Triage Vitals [11/09/22 1743]   BP Temp Temp Source Heart Rate Resp SpO2 Height Weight   109/68 99.6 °F (37.6 °C) Oral 79 16 98 % -- 135 lb (61.2 kg)       Physical Exam  Vitals and nursing note reviewed. Constitutional:       Appearance: Normal appearance. She is well-developed. She is not ill-appearing or diaphoretic. HENT:      Head: Normocephalic and atraumatic. Right Ear: External ear normal.      Left Ear: External ear normal.      Nose: Nose normal.   Eyes:      General:         Right eye: No discharge. Left eye: No discharge. Cardiovascular:      Rate and Rhythm: Normal rate and regular rhythm. Heart sounds: Normal heart sounds.  No murmur heard.    No friction rub. No gallop. Pulmonary:      Effort: Pulmonary effort is normal. No respiratory distress. Breath sounds: Normal breath sounds. No stridor. No wheezing or rales. Chest:      Chest wall: No tenderness. Abdominal:      General: Bowel sounds are normal. There is no distension. Palpations: Abdomen is soft. There is no mass. Tenderness: There is no abdominal tenderness. There is no guarding or rebound. Musculoskeletal:         General: Normal range of motion. Cervical back: Normal range of motion and neck supple. Skin:     General: Skin is warm and dry. Coloration: Skin is not pale. Neurological:      General: No focal deficit present. Mental Status: She is alert and oriented to person, place, and time. Psychiatric:         Mood and Affect: Mood normal.         Behavior: Behavior normal.       DIAGNOSTIC RESULTS   LABS:    Labs Reviewed   COVID-19, RAPID - Abnormal; Notable for the following components:       Result Value    SARS-CoV-2, NAAT DETECTED (*)     All other components within normal limits   CBC WITH AUTO DIFFERENTIAL - Abnormal; Notable for the following components:    Lymphocytes Absolute 0.5 (*)     All other components within normal limits   COMPREHENSIVE METABOLIC PANEL W/ REFLEX TO MG FOR LOW K - Abnormal; Notable for the following components:    BUN 5 (*)     All other components within normal limits   URINALYSIS WITH REFLEX TO CULTURE - Abnormal; Notable for the following components:    Ketones, Urine 40 (*)     All other components within normal limits   RAPID INFLUENZA A/B ANTIGENS   LIPASE       When ordered, only abnormal lab results are displayed. All other labs were within normal range or not returned as of this dictation. EKG: When ordered, EKG's are interpreted by the Emergency Department Physician in the absence of a cardiologist.  Please see their note for interpretation of EKG.       RADIOLOGY:   Non-plain film images such as CT, Ultrasound and MRI are read by the radiologist. Plain radiographic images are visualized andpreliminarily interpreted by the  ED Provider with the below findings:        Interpretation [facility] Radiologist below, if available at the time of this note:    No orders to display     No results found. PROCEDURES   Unless otherwise noted below, none     Procedures    CRITICAL CARE TIME   N/A    CONSULTS:  None      EMERGENCY DEPARTMENT COURSE and DIFFERENTIAL DIAGNOSIS/MDM:   Vitals:    Vitals:    11/09/22 1743   BP: 109/68   Pulse: 79   Resp: 16   Temp: 99.6 °F (37.6 °C)   TempSrc: Oral   SpO2: 98%   Weight: 135 lb (61.2 kg)       Patient was given thefollowing medications:  Medications   ondansetron (ZOFRAN) injection 4 mg (4 mg IntraVENous Given 11/9/22 1754)   0.9 % sodium chloride bolus (0 mLs IntraVENous Stopped 11/9/22 1858)   ketorolac (TORADOL) injection 15 mg (15 mg IntraVENous Given 11/9/22 1754)         Is this patient to be included in the SEP-1 Core Measure due to severe sepsis or septic shock? No   Exclusion criteria - the patient is NOT to be included for SEP-1 Core Measure due to:  Viral etiology found or highly suspected (including COVID-19) without concomitant bacterial infection    This patient has normal vital signs, normal oxygen saturation rate, and responded well to the IV fluids. She was given normal saline, she is slightly dehydrated as evidenced by the ketones in her urine. I believe that most of her symptoms are secondary to COVID. There is no indication for admission at this time. I am the Primary Clinician of Record. FINAL IMPRESSION      1. COVID    2. Dehydration    3.  Nausea and vomiting, unspecified vomiting type          DISPOSITION/PLAN   DISPOSITION Decision To Discharge 11/09/2022 06:33:19 PM      PATIENT REFERREDTO:  [facility]) Pre-Services  [phone]  Call   to arrange for an after ER visit and to establish care with a PCP      DISCHARGE MEDICATIONS:  Discharge Medication List as of 11/9/2022  6:46 PM        START taking these medications    Details   ibuprofen (IBU) 800 MG tablet Take 1 tablet by mouth every 8 hours as needed for Pain, Disp-20 tablet, R-0Normal      nirmatrelvir/ritonavir (PAXLOVID) 20 x 150 MG & 10 x 100MG TBPK Take 3 tablets (two 150 mg nirmatrelvir and one 100 mg ritonavir tablets) by mouth every 12 hours for 5 days. , Disp-30 tablet, R-0Normal             DISCONTINUED MEDICATIONS:  Discharge Medication List as of 11/9/2022  6:46 PM        STOP taking these medications       naproxen (NAPROSYN) 250 MG tablet Comments:   Reason for Stopping:                      (Please note that portions ofthis note were completed with a voice recognition program.  Efforts were made to edit the dictations but occasionally words are mis-transcribed.)    Nimisha Laurent PA-C (electronically signed)             Nimisha Laurent PA-C  11/09/22 2004

## 2023-01-20 ENCOUNTER — HOSPITAL ENCOUNTER (EMERGENCY)
Age: 28
Discharge: LEFT BEFORE DISCHARGE | End: 2023-01-20
Attending: EMERGENCY MEDICINE
Payer: COMMERCIAL

## 2023-01-20 ENCOUNTER — ANCILLARY PROCEDURE (OUTPATIENT)
Dept: EMERGENCY DEPT | Age: 28
End: 2023-01-20
Payer: COMMERCIAL

## 2023-01-20 VITALS
BODY MASS INDEX: 24.07 KG/M2 | WEIGHT: 144.5 LBS | TEMPERATURE: 98.7 F | HEART RATE: 80 BPM | DIASTOLIC BLOOD PRESSURE: 69 MMHG | RESPIRATION RATE: 18 BRPM | SYSTOLIC BLOOD PRESSURE: 108 MMHG | OXYGEN SATURATION: 100 % | HEIGHT: 65 IN

## 2023-01-20 DIAGNOSIS — O36.80X0 PREGNANCY OF UNKNOWN ANATOMIC LOCATION: ICD-10-CM

## 2023-01-20 DIAGNOSIS — N76.0 BACTERIAL VAGINOSIS: ICD-10-CM

## 2023-01-20 DIAGNOSIS — B96.89 BACTERIAL VAGINOSIS: ICD-10-CM

## 2023-01-20 DIAGNOSIS — O46.90 VAGINAL BLEEDING IN PREGNANCY: Primary | ICD-10-CM

## 2023-01-20 LAB
ANION GAP SERPL CALCULATED.3IONS-SCNC: 9 MMOL/L (ref 3–16)
BACTERIA WET PREP: ABNORMAL
BACTERIA: ABNORMAL /HPF
BILIRUBIN URINE: NEGATIVE
BLOOD, URINE: ABNORMAL
BUN BLDV-MCNC: 11 MG/DL (ref 7–20)
CALCIUM SERPL-MCNC: 9.3 MG/DL (ref 8.3–10.6)
CHLORIDE BLD-SCNC: 104 MMOL/L (ref 99–110)
CLARITY: CLEAR
CLUE CELLS: ABNORMAL
CO2: 24 MMOL/L (ref 21–32)
COLOR: YELLOW
CREAT SERPL-MCNC: 0.7 MG/DL (ref 0.6–1.1)
EPITHELIAL CELLS WET PREP: ABNORMAL
EPITHELIAL CELLS, UA: ABNORMAL /HPF (ref 0–5)
GFR SERPL CREATININE-BSD FRML MDRD: >60 ML/MIN/{1.73_M2}
GLUCOSE BLD-MCNC: 89 MG/DL (ref 70–99)
GLUCOSE URINE: NEGATIVE MG/DL
GONADOTROPIN, CHORIONIC (HCG) QUANT: 1097 MIU/ML
HCG(URINE) PREGNANCY TEST: POSITIVE
HCT VFR BLD CALC: 35 % (ref 36–48)
HEMOGLOBIN: 11.8 G/DL (ref 12–16)
KETONES, URINE: NEGATIVE MG/DL
LEUKOCYTE ESTERASE, URINE: NEGATIVE
MCH RBC QN AUTO: 29.5 PG (ref 26–34)
MCHC RBC AUTO-ENTMCNC: 33.7 G/DL (ref 31–36)
MCV RBC AUTO: 87.7 FL (ref 80–100)
MICROSCOPIC EXAMINATION: YES
NITRITE, URINE: NEGATIVE
PDW BLD-RTO: 14.4 % (ref 12.4–15.4)
PH UA: 6.5 (ref 5–8)
PLATELET # BLD: 220 K/UL (ref 135–450)
PMV BLD AUTO: 8.3 FL (ref 5–10.5)
POTASSIUM REFLEX MAGNESIUM: 4 MMOL/L (ref 3.5–5.1)
PROTEIN UA: NEGATIVE MG/DL
RBC # BLD: 3.99 M/UL (ref 4–5.2)
RBC UA: ABNORMAL /HPF (ref 0–4)
RBC WET PREP: ABNORMAL
SODIUM BLD-SCNC: 137 MMOL/L (ref 136–145)
SOURCE WET PREP: ABNORMAL
SPECIFIC GRAVITY UA: 1.02 (ref 1–1.03)
TRICHOMONAS PREP: ABNORMAL
URINE REFLEX TO CULTURE: ABNORMAL
URINE TYPE: ABNORMAL
UROBILINOGEN, URINE: 0.2 E.U./DL
WBC # BLD: 4.2 K/UL (ref 4–11)
WBC UA: ABNORMAL /HPF (ref 0–5)
WBC WET PREP: ABNORMAL
YEAST WET PREP: ABNORMAL

## 2023-01-20 PROCEDURE — 84703 CHORIONIC GONADOTROPIN ASSAY: CPT

## 2023-01-20 PROCEDURE — 84702 CHORIONIC GONADOTROPIN TEST: CPT

## 2023-01-20 PROCEDURE — 87210 SMEAR WET MOUNT SALINE/INK: CPT

## 2023-01-20 PROCEDURE — 76815 OB US LIMITED FETUS(S): CPT

## 2023-01-20 PROCEDURE — 80048 BASIC METABOLIC PNL TOTAL CA: CPT

## 2023-01-20 PROCEDURE — 99284 EMERGENCY DEPT VISIT MOD MDM: CPT

## 2023-01-20 PROCEDURE — 85027 COMPLETE CBC AUTOMATED: CPT

## 2023-01-20 PROCEDURE — 87591 N.GONORRHOEAE DNA AMP PROB: CPT

## 2023-01-20 PROCEDURE — 36415 COLL VENOUS BLD VENIPUNCTURE: CPT

## 2023-01-20 PROCEDURE — 81001 URINALYSIS AUTO W/SCOPE: CPT

## 2023-01-20 PROCEDURE — 87491 CHLMYD TRACH DNA AMP PROBE: CPT

## 2023-01-20 RX ORDER — METRONIDAZOLE 500 MG/1
500 TABLET ORAL 2 TIMES DAILY
Qty: 14 TABLET | Refills: 0 | Status: SHIPPED | OUTPATIENT
Start: 2023-01-20 | End: 2023-01-27

## 2023-01-20 ASSESSMENT — PAIN - FUNCTIONAL ASSESSMENT: PAIN_FUNCTIONAL_ASSESSMENT: NONE - DENIES PAIN

## 2023-01-20 NOTE — ED NOTES
Patient and friend ordering lunch prior to transfer to [facility] ED patient was encouraged to remain NPO. Patient also inquired if she \"could set a appt for another day\"  nursing discussed the medical urgency for the transfer, after several hours patient has not presented to [facility] ED for further treatment.      Diamond Carmona RN  01/20/23 3985

## 2023-01-20 NOTE — ED NOTES
Patient given transfer paperwork and instructions friend to transport, patient instructed to remain npo.        Gerry Loco RN  01/20/23 9782

## 2023-01-20 NOTE — ED PROVIDER NOTES
[id number] [address] ENCOUNTER        Patient Name: Gorge Whiet  MRN: [id number]  [place] 1995  Date of evaluation: 1/20/2023  Provider: Levi Hirsch MD  PCP: No primary care provider on file. Note Started: 11:51 AM EST 1/20/23    CHIEF COMPLAINT       Vaginal Bleeding (Possible pregnancy)      HISTORY OF PRESENT ILLNESS: 1 or more Elements     History from : Patient    Limitations to history : None    Gorge White is a 25 y.o. female who presents for evaluation of vaginal bleeding, possible pregnancy. Patient took multiple home pregnancy test that were positive. She is sexually active. No significant abdominal pain. She has had mild amount of vaginal bleeding. She states that she is about 10 days late from her period. She has been previously pregnant. No history of ectopic pregnancy. Nursing Notes were all reviewed and agreed with or any disagreements were addressed in the HPI. REVIEW OF SYSTEMS :      Review of Systems    Positives and Pertinent negatives as per HPI. SURGICAL HISTORY   History reviewed. No pertinent surgical history. CURRENTMEDICATIONS       Previous Medications    DICYCLOMINE (BENTYL) 10 MG CAPSULE    Take 1 capsule by mouth every 6 hours as needed (cramps)    IBUPROFEN (IBU) 800 MG TABLET    Take 1 tablet by mouth every 8 hours as needed for Pain       ALLERGIES     Patient has no known allergies. FAMILYHISTORY     History reviewed. No pertinent family history.      SOCIAL HISTORY       Social History     Tobacco Use    Smoking status: Never    Smokeless tobacco: Never   Substance Use Topics    Alcohol use: Not Currently    Drug use: Not Currently       SCREENINGS        Ka[place] Coma Scale  Eye Opening: Spontaneous  Best Verbal Response: Oriented  Best Motor Response: Obeys commands  [place] Coma Scale Score: 15                CIWA Assessment  BP: 108/69  Heart Rate: 80           PHYSICAL EXAM  1 or more Elements ED Triage Vitals [01/20/23 1012]   BP Temp Temp Source Heart Rate Resp SpO2 Height Weight   108/69 98.7 °F (37.1 °C) Oral 80 18 100 % 5' 5\" (1.651 m) 144 lb 8 oz (65.5 kg)       General: No acute distress. Alert and Oriented. Appears stated age. HEENT: No difficulty tolerating oral secretions. Cardiac: Regular rate and rhythm. Chest: No respiratory distress. No increased work of breathing. No use of accessory muscles for respiration. Abdomen: Soft, nontender, nondistended, non-peritonitic. Pelvic: Exam performed with chaperone and patient consent. External genitalia appear normal. There is no rash or lesion. There is no cervical motion tenderness. There is no cervical discharge. The cervical os is closed. There is scant blood in the vaginal vault. There is no adnexal tenderness bilaterally. Extremities:No significant lower extremity edema. Lower extremities are symmetric. Neuro: Moving all extremities. No focal deficits. Speech is clear. Skin:No rash, no erythema  Psych: Calm and cooperative. DIAGNOSTIC RESULTS   LABS:    Labs Reviewed   WET PREP, GENITAL - Abnormal; Notable for the following components:       Result Value    Clue Cells, Wet Prep 2+ (*)     All other components within normal limits   CBC - Abnormal; Notable for the following components:    RBC 3.99 (*)     Hemoglobin 11.8 (*)     Hematocrit 35.0 (*)     All other components within normal limits   URINALYSIS WITH REFLEX TO CULTURE - Abnormal; Notable for the following components:    Blood, Urine SMALL (*)     All other components within normal limits   MICROSCOPIC URINALYSIS - Abnormal; Notable for the following components:    WBC, UA 6-9 (*)     Bacteria, UA 4+ (*)     All other components within normal limits   C.TRACHOMATIS N.GONORRHOEAE DNA   BASIC METABOLIC PANEL W/ REFLEX TO MG FOR LOW K   HCG, QUANTITATIVE, PREGNANCY   PREGNANCY, URINE       When ordered only abnormal lab results are displayed.  All other labs were within normal range or not returned as of this dictation. RADIOLOGY:   Non-plain film images such as CT, Ultrasound and MRI are read by the radiologist. Plain radiographic images are visualized and preliminarily interpreted by the ED Provider with the below findings:        Interpretation per the Radiologist below, if available at the time of this note:    No orders to display     No results found. Bedside Ultrasound, as interpreted by me, if performed:    No results found. PROCEDURES     Unless otherwise noted below, none     Procedures    CRITICAL CARE TIME     I personally spent a total of 0 minutes of critical care time in obtaining history, performing a physical exam, bedside monitoring of interventions, collecting and interpreting tests and discussion with consultants but excluding time spent performing procedures, treating other patients and teaching time. PAST MEDICAL HISTORY      has no past medical history on file. EMERGENCY DEPARTMENT COURSE and DIFFERENTIAL DIAGNOSIS/MDM:     Vitals:    Vitals:    01/20/23 1012   BP: 108/69   Pulse: 80   Resp: 18   Temp: 98.7 °F (37.1 °C)   TempSrc: Oral   SpO2: 100%   Weight: 144 lb 8 oz (65.5 kg)   Height: 5' 5\" (1.651 m)       Patient was treated with and given the following medications:  Medications - No data to display            CC/HPI Summary, DDx, ED Course, and Reassessment:       68-year-old female presenting with vaginal bleeding, positive pregnancy test, benign abdominal exam no peritoneal change, transabdominal ultrasound does not reveal intrauterine pregnancy she does have beta hCG elevated to 1000. Hemoglobin is stable. She does have findings of bacterial vaginosis on wet prep. Pelvic exam reveals closed cervical os. She will require transfer to [facility] emergency department .   She will be started on Flagyl for concern of bacterial vaginosis. She is instructed to go directly to the [facility] emergency department for facilitation transvaginal ultrasound to help rule out ectopic pregnancy and determine pregnancy location. She expressed understanding with this plan. CONSULTS: (Who and What was discussed)  None    Discussion with Other Professionals : [facility] emergency department attending    Management of the patient was discussed with Dr. Esdras Carlson, [facility] emergency department attending as patient requires transfer for facilitation of ABO/Rh, transvaginal ultrasound for concern of pregnancy of unknown location    Social Determinants : None    Patient's care impacted by chronic condition(s): n/a    Records Reviewed : None    Clinical information obtained from an independent historian. I am the Primary Clinician of Record. FINAL IMPRESSION      1. Vaginal bleeding in pregnancy    2. Pregnancy of unknown anatomic location    3. Bacterial vaginosis          DISPOSITION/PLAN     DISPOSITION Decision To Transfer 01/20/2023 11:29:49 AM      PATIENT REFERRED TO:  No follow-up provider specified. DISCHARGE MEDICATIONS:  Patient was given scripts for the following medications. I counseled patient how to take these medications:  New Prescriptions    METRONIDAZOLE (FLAGYL) 500 MG TABLET    Take 1 tablet by mouth 2 times daily for 7 days       DISCONTINUED MEDICATIONS:  Discontinued Medications    No medications on file              (This chart was generated in part by using Dragon Dictation system and may contain errors related to that system including errors in grammar, punctuation, and spelling, as well as words and phrases that may be inappropriate.  If there are any questions or concerns please feel free to contact the dictating provider for clarification.)    MD Eliu Restrepo MD  01/20/23 69 Merly Castro MD  01/20/23 4812

## 2023-01-21 LAB
C TRACH DNA GENITAL QL NAA+PROBE: NEGATIVE
N. GONORRHOEAE DNA: NEGATIVE

## 2023-01-24 ENCOUNTER — HOSPITAL ENCOUNTER (EMERGENCY)
Age: 28
Discharge: HOME | End: 2023-01-25
Attending: EMERGENCY MEDICINE
Payer: COMMERCIAL

## 2023-01-24 ENCOUNTER — APPOINTMENT (OUTPATIENT)
Dept: ULTRASOUND IMAGING | Age: 28
End: 2023-01-24
Payer: COMMERCIAL

## 2023-01-24 ENCOUNTER — ANCILLARY PROCEDURE (OUTPATIENT)
Dept: EMERGENCY DEPT | Age: 28
End: 2023-01-24
Payer: COMMERCIAL

## 2023-01-24 DIAGNOSIS — O36.80X0 PREGNANCY OF UNKNOWN ANATOMIC LOCATION: ICD-10-CM

## 2023-01-24 DIAGNOSIS — O46.90 VAGINAL BLEEDING IN PREGNANCY: Primary | ICD-10-CM

## 2023-01-24 LAB
ABO/RH: NORMAL
AMORPHOUS: ABNORMAL /HPF
ANION GAP SERPL CALCULATED.3IONS-SCNC: 10 MMOL/L (ref 3–16)
ANTIBODY SCREEN: NORMAL
BACTERIA WET PREP: ABNORMAL
BACTERIA: ABNORMAL /HPF
BASOPHILS ABSOLUTE: 0 K/UL (ref 0–0.2)
BASOPHILS RELATIVE PERCENT: 0.2 %
BILIRUBIN URINE: NEGATIVE
BLOOD, URINE: ABNORMAL
BUN BLDV-MCNC: 12 MG/DL (ref 7–20)
CALCIUM SERPL-MCNC: 9 MG/DL (ref 8.3–10.6)
CHLORIDE BLD-SCNC: 101 MMOL/L (ref 99–110)
CLARITY: ABNORMAL
CLUE CELLS: ABNORMAL
CO2: 23 MMOL/L (ref 21–32)
COLOR: YELLOW
CREAT SERPL-MCNC: 0.7 MG/DL (ref 0.6–1.1)
EOSINOPHILS ABSOLUTE: 0.1 K/UL (ref 0–0.6)
EOSINOPHILS RELATIVE PERCENT: 1.1 %
EPITHELIAL CELLS WET PREP: ABNORMAL
EPITHELIAL CELLS, UA: ABNORMAL /HPF (ref 0–5)
GFR SERPL CREATININE-BSD FRML MDRD: >60 ML/MIN/{1.73_M2}
GLUCOSE BLD-MCNC: 95 MG/DL (ref 70–99)
GLUCOSE URINE: NEGATIVE MG/DL
GONADOTROPIN, CHORIONIC (HCG) QUANT: 2407 MIU/ML
HCT VFR BLD CALC: 35.7 % (ref 36–48)
HEMOGLOBIN: 11.7 G/DL (ref 12–16)
KETONES, URINE: ABNORMAL MG/DL
LEUKOCYTE ESTERASE, URINE: NEGATIVE
LYMPHOCYTES ABSOLUTE: 1.8 K/UL (ref 1–5.1)
LYMPHOCYTES RELATIVE PERCENT: 32.8 %
MCH RBC QN AUTO: 29.7 PG (ref 26–34)
MCHC RBC AUTO-ENTMCNC: 32.8 G/DL (ref 31–36)
MCV RBC AUTO: 90.7 FL (ref 80–100)
MICROSCOPIC EXAMINATION: YES
MONOCYTES ABSOLUTE: 0.5 K/UL (ref 0–1.3)
MONOCYTES RELATIVE PERCENT: 9.9 %
NEUTROPHILS ABSOLUTE: 3 K/UL (ref 1.7–7.7)
NEUTROPHILS RELATIVE PERCENT: 56 %
NITRITE, URINE: POSITIVE
PDW BLD-RTO: 14.6 % (ref 12.4–15.4)
PH UA: 7 (ref 5–8)
PLATELET # BLD: 234 K/UL (ref 135–450)
PMV BLD AUTO: 8 FL (ref 5–10.5)
POTASSIUM REFLEX MAGNESIUM: 3.6 MMOL/L (ref 3.5–5.1)
PROTEIN UA: NEGATIVE MG/DL
RBC # BLD: 3.94 M/UL (ref 4–5.2)
RBC UA: ABNORMAL /HPF (ref 0–4)
RBC WET PREP: ABNORMAL
SODIUM BLD-SCNC: 134 MMOL/L (ref 136–145)
SOURCE WET PREP: ABNORMAL
SPECIFIC GRAVITY UA: 1.02 (ref 1–1.03)
TRICHOMONAS PREP: ABNORMAL
URINE REFLEX TO CULTURE: ABNORMAL
URINE TYPE: ABNORMAL
UROBILINOGEN, URINE: 0.2 E.U./DL
WBC # BLD: 5.4 K/UL (ref 4–11)
WBC UA: ABNORMAL /HPF (ref 0–5)
WBC WET PREP: ABNORMAL
YEAST WET PREP: ABNORMAL
YEAST: PRESENT /HPF

## 2023-01-24 PROCEDURE — 85025 COMPLETE CBC W/AUTO DIFF WBC: CPT

## 2023-01-24 PROCEDURE — 84702 CHORIONIC GONADOTROPIN TEST: CPT

## 2023-01-24 PROCEDURE — 36415 COLL VENOUS BLD VENIPUNCTURE: CPT

## 2023-01-24 PROCEDURE — 84144 ASSAY OF PROGESTERONE: CPT

## 2023-01-24 PROCEDURE — 87210 SMEAR WET MOUNT SALINE/INK: CPT

## 2023-01-24 PROCEDURE — 86900 BLOOD TYPING SEROLOGIC ABO: CPT

## 2023-01-24 PROCEDURE — 6370000000 HC RX 637 (ALT 250 FOR IP): Performed by: PHYSICIAN ASSISTANT

## 2023-01-24 PROCEDURE — 87186 SC STD MICRODIL/AGAR DIL: CPT

## 2023-01-24 PROCEDURE — 81001 URINALYSIS AUTO W/SCOPE: CPT

## 2023-01-24 PROCEDURE — 86850 RBC ANTIBODY SCREEN: CPT

## 2023-01-24 PROCEDURE — 80048 BASIC METABOLIC PNL TOTAL CA: CPT

## 2023-01-24 PROCEDURE — 87086 URINE CULTURE/COLONY COUNT: CPT

## 2023-01-24 PROCEDURE — 99284 EMERGENCY DEPT VISIT MOD MDM: CPT

## 2023-01-24 PROCEDURE — 76815 OB US LIMITED FETUS(S): CPT

## 2023-01-24 PROCEDURE — 87077 CULTURE AEROBIC IDENTIFY: CPT

## 2023-01-24 PROCEDURE — 76801 OB US < 14 WKS SINGLE FETUS: CPT

## 2023-01-24 PROCEDURE — 86901 BLOOD TYPING SEROLOGIC RH(D): CPT

## 2023-01-24 RX ORDER — ACETAMINOPHEN 500 MG
1000 TABLET ORAL ONCE
Status: COMPLETED | OUTPATIENT
Start: 2023-01-24 | End: 2023-01-24

## 2023-01-24 RX ADMIN — ACETAMINOPHEN 1000 MG: 500 TABLET ORAL at 21:50

## 2023-01-24 ASSESSMENT — PAIN SCALES - GENERAL: PAINLEVEL_OUTOF10: 10

## 2023-01-24 ASSESSMENT — PAIN DESCRIPTION - LOCATION: LOCATION: ABDOMEN

## 2023-01-24 ASSESSMENT — PAIN - FUNCTIONAL ASSESSMENT: PAIN_FUNCTIONAL_ASSESSMENT: 0-10

## 2023-01-24 ASSESSMENT — PAIN DESCRIPTION - PAIN TYPE: TYPE: ACUTE PAIN

## 2023-01-25 VITALS
HEIGHT: 65 IN | DIASTOLIC BLOOD PRESSURE: 54 MMHG | RESPIRATION RATE: 18 BRPM | SYSTOLIC BLOOD PRESSURE: 101 MMHG | BODY MASS INDEX: 24.16 KG/M2 | HEART RATE: 71 BPM | TEMPERATURE: 99.1 F | OXYGEN SATURATION: 100 % | WEIGHT: 145 LBS

## 2023-01-25 LAB — PROGESTERONE LEVEL: 3.55 NG/ML

## 2023-01-25 ASSESSMENT — ENCOUNTER SYMPTOMS
SHORTNESS OF BREATH: 0
VOMITING: 1
NAUSEA: 1
ABDOMINAL PAIN: 1
EYE REDNESS: 0

## 2023-01-25 NOTE — ED NOTES
Pt discharged from ED in stable condition. Discharge instruction explain, all question answered. Prescription given. Pt walked to [facility] independently.        Hannha Abbott RN  01/25/23 4813

## 2023-01-25 NOTE — ED PROVIDER NOTES
[address] [facility] ENCOUNTER          PHYSICIAN ASSISTANT NOTE       Date of evaluation: 1/24/2023    Chief Complaint     Vaginal Bleeding (Pt to ED with complaint of being approx 5 weeks pregnant and now is having vaginal bleeding. Pt states she started bleeding on 1/17, a few days after she found out she was pregnant. Pt states now she is having a lot of bleeding and abdominal pain. Pt denies soaking through any pads. Ambulatory to room.)      History of Present Illness     Yen Wilson is a 25 y.o. female [facility] presenting with vaginal bleeding. Patient reports that she had a positive home pregnancy test on 1/16. (LMP 12/13). Two days later on 1/18, she began having light vaginal bleeding/spotting. She reports that in the past few days, this bleeding has become heavier. She is changing a pad every few hours. She has passed several clots < size of a dime. She also reports a sharp pain in her left lower abdomen with radiation to the back. She has been nauseated and vomiting in the past few days. She denies fevers, chills, dysuria, urinary frequency. She was seen at [address] [place] [address] ED on 1/20. She reports her cervix was closed and bedside ultrasound did not show a definitive IUP. She was to be transferred to [address] [place] [address] for formal ultrasound but chose to go home instead. She does not have an OBGYN.    ASSESSMENT / PLAN  (MEDICAL DECISION MAKING)     INITIAL VITALS: BP: 107/69, Temp: 99.1 °F (37.3 °C), Heart Rate: 80, Resp: 16, SpO2: 96 %    Yen Wilson is a 25 y.o. female who presents for evaluation of vaginal bleeding during pregnancy. She has had vaginal bleeding since 1/18 but it became heavier several days ago. She also reports sharp LLQ abdominal pains, nausea and vomiting. Patient's LMP was 12/13. She was seen at [place] ED and transabdominal ultrasound did not show definite IUP. She was to be transferred here for transvaginal ultrasound but went home instead.  On exam today, she has stable vital signs. She is tender in the lower abdomen without rebound and guarding. Laboratory workup revealed stable hemoglobin 11.7, unremarkable BMP, quant HCG risen to 2407 (from 1000 four days ago). She is Rh positive and does not require rhogam. UA with positive nitrites, moderate blood. Micro with yeast, no wbc, 4+ bacteria. Will send to culture. Pelvic exam revealed vaginal bleeding. Cervix os is closed. No CMT. +left adnexal tenderness. Bedside ultrasound revealed thickened endometrium without definite IUP. Discussed with radiologist for approval for ultrasound to be called in as it is after hours. Formal ultrasound revealed no IUP, no ectopic pregnancy. Complex left ovarian cyst with thick wall. Normal right ovary. Case discussed with OBGYN on call - Dr. Aliyah Carmona will arrange follow up in 48 hours. She also requested progesterone be drawn - this was added on. Gonorrhea/chlamydia swab from 1/20 negative. We did not repeat. Wet prep positive for clue cells. Patient was prescribed flagyl at last ED visit but just started this yesterday. She was instructed to continue prescription. Patient instructed to call OBGYN office tomorrow to schedule appt. She is stable for discharge home at this time. Return precautions discussed. Medical Decision Making  Problems Addressed:  Pregnancy of unknown anatomic location: acute illness or injury  Vaginal bleeding in pregnancy: acute illness or injury    Amount and/or Complexity of Data Reviewed  Labs: ordered. Decision-making details documented in ED Course. Radiology: ordered and independent interpretation performed. Risk  OTC drugs. This patient was also evaluated by the attending physician. All care plans were discussed and agreed upon. Clinical Impression     1. Vaginal bleeding in pregnancy    2.  Pregnancy of unknown anatomic location        Disposition     PATIENT REFERRED TO:  Celso Valdez MD  [address] [phone]    Call in 1 day  (OBGYN)    [facility] Emergency Department  [address] [place] [address]  [phone]    If symptoms worsen    DISCHARGE MEDICATIONS:  Current Discharge Medication List          DISPOSITION Decision To Discharge 01/25/2023 01:13:10 AM        Diagnostic Results and Other Data     RADIOLOGY:  US OB LESS THAN 14 WEEKS SINGLE OR FIRST GESTATION   Final Result         1. No sign of any intrauterine gestation by ultrasound      2. Complex left ovarian cyst with thick wall measuring 4.1 x 3.1 x 3.2 cm. No identified and ectopic gestation at this time. Blood flow is intact with prominent periuterine vessels. Correlate with beta hCG levels to ensure there are low-lying over time      3. Normal-appearing right ovary      US ED PREG UTERUS LTD 1 OR MORE FETUSES   Final Result         1. No sign of any intrauterine gestation by ultrasound      2. Complex left ovarian cyst with thick wall measuring 4.1 x 3.1 x 3.2 cm. No identified and ectopic gestation at this time. Blood flow is intact with prominent periuterine vessels. Correlate with beta hCG levels to ensure there are low-lying over time      3.  Normal-appearing right ovary          LABS:   Results for orders placed or performed during the hospital encounter of 01/24/23   Wet prep, genital    Specimen: Vaginal   Result Value Ref Range    Trichomonas Prep None Seen     Yeast, Wet Prep None Seen     Clue Cells, Wet Prep 2+ (A)     WBC, Wet Prep 1+     RBC, Wet Prep 4+     Epi Cells 2+     Bacteria 4+     Source Wet Prep Vaginal    BMP w/ Reflex to MG   Result Value Ref Range    Sodium 134 (L) 136 - 145 mmol/L    Potassium reflex Magnesium 3.6 3.5 - 5.1 mmol/L    Chloride 101 99 - 110 mmol/L    CO2 23 21 - 32 mmol/L    Anion Gap 10 3 - 16    Glucose 95 70 - 99 mg/dL    BUN 12 7 - 20 mg/dL    Creatinine 0.7 0.6 - 1.1 mg/dL    Est, Glom Filt Rate >60 >60    Calcium 9.0 8.3 - 10.6 mg/dL   CBC with Auto Differential Result Value Ref Range    WBC 5.4 4.0 - 11.0 K/uL    RBC 3.94 (L) 4.00 - 5.20 M/uL    Hemoglobin 11.7 (L) 12.0 - 16.0 g/dL    Hematocrit 35.7 (L) 36.0 - 48.0 %    MCV 90.7 80.0 - 100.0 fL    MCH 29.7 26.0 - 34.0 pg    MCHC 32.8 31.0 - 36.0 g/dL    RDW 14.6 12.4 - 15.4 %    Platelets 966 503 - 794 K/uL    MPV 8.0 5.0 - 10.5 fL    Neutrophils % 56.0 %    Lymphocytes % 32.8 %    Monocytes % 9.9 %    Eosinophils % 1.1 %    Basophils % 0.2 %    Neutrophils Absolute 3.0 1.7 - 7.7 K/uL    Lymphocytes Absolute 1.8 1.0 - 5.1 K/uL    Monocytes Absolute 0.5 0.0 - 1.3 K/uL    Eosinophils Absolute 0.1 0.0 - 0.6 K/uL    Basophils Absolute 0.0 0.0 - 0.2 K/uL   HCG Serum, Quantitative   Result Value Ref Range    hCG Quant 2407.0 <5.0 mIU/mL   Urinalysis with Reflex to Culture    Specimen: Urine   Result Value Ref Range    Color, UA Yellow Straw/Yellow    Clarity, UA SL CLOUDY (A) Clear    Glucose, Ur Negative Negative mg/dL    Bilirubin Urine Negative Negative    Ketones, Urine TRACE (A) Negative mg/dL    Specific Gravity, UA 1.020 1.005 - 1.030    Blood, Urine MODERATE (A) Negative    pH, UA 7.0 5.0 - 8.0    Protein, UA Negative Negative mg/dL    Urobilinogen, Urine 0.2 <2.0 E.U./dL    Nitrite, Urine POSITIVE (A) Negative    Leukocyte Esterase, Urine Negative Negative    Microscopic Examination YES     Urine Type Voided     Urine Reflex to Culture Not Indicated    Microscopic Urinalysis   Result Value Ref Range    WBC, UA None seen 0 - 5 /HPF    RBC, UA 11-20 (A) 0 - 4 /HPF    Epithelial Cells, UA 0-1 0 - 5 /HPF    Bacteria, UA 4+ (A) None Seen /HPF    Amorphous, UA 2+ /HPF    Yeast, UA Present (A) None Seen /HPF   TYPE AND SCREEN   Result Value Ref Range    ABO/Rh B POS     Antibody Screen NEG      ED BEDSIDE ULTRASOUND:  US ED PREG UTERUS LTD 1 OR MORE FETUSES    Result Date: 1/25/2023  TRANSVAGINAL OBSTETRIC ULTRASOUND on 1/24/2023 HISTORY: Positive pregnant with abdominal pain and vaginal bleeding, rule out ectopic or bleed, beta-hCG levels 2407, rule out ectopic PROCEDURE:  The uterus is in the midline and measures 8.6 x 6.0 x 4.7 cm. Endometrial thickness is 12 mm There no dominant myometrial fibroids identified. Echogenicity appears homogeneous. There is a small amount of fluid in the region of the cervix. There is no free fluid in the region of the adnexa. Transvaginal scanning was needed to better visualize for the gestation and there is no identifiable interuterine gestation. No yolk sac or fetal pole Prominent uterine vessels noted The right ovary measures 3.2 x 3.6 x 1.4 cm with normal-appearing ovarian volume of 1.4 cm3. The left ovary measures 5.7 x 4.3 x 3.5 cm, with an ovarian cyst measuring 4.1 x 3.1 x 3.2 cm, slightly thick walls and irregular, likely complex. Volume of 56.2 cm3. . Blood flow intact to both ovaries      RECENT VITALS:  BP: (!) 101/54, Temp: 99.1 °F (37.3 °C), Heart Rate: 71, Resp: 18, SpO2: 100 %     Procedures         ED Course     Nursing Notes, Past Medical Hx,Past Surgical Hx, Social Hx, Allergies, and Family Hx were reviewed. The patient was given the following medications:  Orders Placed This Encounter   Medications    acetaminophen (TYLENOL) tablet 1,000 mg       CONSULTS:  IP CONSULT TO OB GYN    Review of Systems     Review of Systems   Constitutional:  Negative for chills and fever. HENT:  Negative for congestion. Eyes:  Negative for redness. Respiratory:  Negative for shortness of breath. Cardiovascular:  Negative for chest pain. Gastrointestinal:  Positive for abdominal pain, nausea and vomiting. Genitourinary:  Positive for vaginal bleeding. Negative for difficulty urinating, dysuria and frequency. Musculoskeletal:  Negative for gait problem. Skin:  Negative for wound. Neurological:  Negative for dizziness. Psychiatric/Behavioral:  The patient is not nervous/anxious.       Past Medical, Surgical, Family, and Social History     She has no past medical history on file.  She has no past surgical history on file. Her family history is not on file. She reports that she has never smoked. She has never used smokeless tobacco. She reports that she does not currently use alcohol. She reports that she does not currently use drugs. Medications     Current Discharge Medication List        CONTINUE these medications which have NOT CHANGED    Details   metroNIDAZOLE (FLAGYL) 500 MG tablet Take 1 tablet by mouth 2 times daily for 7 days  Qty: 14 tablet, Refills: 0      ibuprofen (IBU) 800 MG tablet Take 1 tablet by mouth every 8 hours as needed for Pain  Qty: 20 tablet, Refills: 0      dicyclomine (BENTYL) 10 MG capsule Take 1 capsule by mouth every 6 hours as needed (cramps)  Qty: 20 capsule, Refills: 0             Allergies     She has No Known Allergies. Physical Exam     INITIAL VITALS: BP: 107/69, Temp: 99.1 °F (37.3 °C), Heart Rate: 80, Resp: 16, SpO2: 96 %  Physical Exam  Vitals and nursing note reviewed. Exam conducted with a chaperone present. Constitutional:       General: She is not in acute distress. HENT:      Head: Normocephalic and atraumatic. Eyes:      Extraocular Movements: Extraocular movements intact. Conjunctiva/sclera: Conjunctivae normal.   Cardiovascular:      Rate and Rhythm: Normal rate and regular rhythm. Pulmonary:      Effort: Pulmonary effort is normal. No respiratory distress. Breath sounds: Normal breath sounds. No wheezing, rhonchi or rales. Abdominal:      General: Bowel sounds are normal. There is no distension. Palpations: Abdomen is soft. Tenderness: There is abdominal tenderness in the right lower quadrant, suprapubic area and left lower quadrant. There is no guarding or rebound. Genitourinary:     Vagina: Bleeding present. Cervix: No cervical motion tenderness. Adnexa:         Right: No tenderness. Left: Tenderness present. Comments: Cervix is closed.   Musculoskeletal:         General: No deformity. Cervical back: Neck supple. Skin:     General: Skin is warm and dry. Neurological:      Mental Status: She is alert and oriented to person, place, and time.    Psychiatric:         Mood and Affect: Mood normal.         Behavior: Behavior normal.          Alfred Bingham PA-C  01/25/23 0118

## 2023-01-25 NOTE — DISCHARGE INSTRUCTIONS
Follow up with OBGYN in 48 hours. Call tomorrow to schedule. You may take tylenol at home every 6 hours as needed for pain.     Return to the ED with new or worsening symptoms, including worsening pain, saturating a pad requiring change <1 hr.

## 2023-01-25 NOTE — ED PROVIDER NOTES
ED Attending Attestation Note     Date of evaluation: 1/24/2023    This patient was seen by the advance practice provider. I have seen and examined the patient, agree with the workup, evaluation, management and diagnosis. The care plan has been discussed. My assessment reveals complaints of vaginal bleeding. Patient is approximately 6 weeks pregnant by LMP and is been having vaginal bleeding for last several days. 3 days ago she was seen at [facility] emergency department and found to be pregnant with a quantitative hCG of 1000. Transabdominal ultrasound showed no intrauterine pregnancy and and the patient was to be transferred to this facility for transvaginal ultrasound but she did not show up. Patient presents today complaining of continued bleeding. Patient has a soft exam.  Quantitative hCG today is 2400. Bedside transvaginal point-of-care ultrasound shows thickened endometrial stripe but no intrauterine pregnancy. Formal ultrasound also demonstrates no intrauterine pregnancy. Will arrange follow-up with gynecology with strict return precautions.        Yara Mojica MD  01/25/23 2819

## 2023-01-26 ENCOUNTER — TELEPHONE (OUTPATIENT)
Dept: GYNECOLOGY | Age: 28
End: 2023-01-26

## 2023-01-26 NOTE — TELEPHONE ENCOUNTER
Try to call patient one more time-see if she can come in at 10:30 am tomorrow. (Or 11 if not taken by someone else).

## 2023-01-26 NOTE — TELEPHONE ENCOUNTER
See if I can add her on for a virtual visit today at 5:00 pm-that way I can order her blood work.  Today 5 pm 1/26/23

## 2023-01-26 NOTE — TELEPHONE ENCOUNTER
Called and spoke to patient, patient says she has a class  today at 5 pm. She wanted to know when can Dr Rodri Ansari see her in person face to face. Informed her that Dr Rodri Ansari  wanted to start with order her blood work then have her come into office.

## 2023-01-26 NOTE — TELEPHONE ENCOUNTER
Patient was see at [facility] for vaginal bleeding in pregnancy. Was given Dr Robby Mae contact info for follow up scheduling. Please advise.  Patient can be reached at [phone]

## 2023-01-26 NOTE — TELEPHONE ENCOUNTER
Called patient to see if she is able to come into office at 11 am on 1/27/23 at 18 [facility] patient 1st time it seemed like someone picked up the phone called back twice and call went straight to .

## 2023-01-27 ENCOUNTER — OFFICE VISIT (OUTPATIENT)
Dept: GYNECOLOGY | Age: 28
End: 2023-01-27

## 2023-01-27 VITALS
HEIGHT: 65 IN | BODY MASS INDEX: 24.19 KG/M2 | SYSTOLIC BLOOD PRESSURE: 91 MMHG | HEART RATE: 88 BPM | RESPIRATION RATE: 17 BRPM | DIASTOLIC BLOOD PRESSURE: 57 MMHG | WEIGHT: 145.2 LBS | OXYGEN SATURATION: 100 %

## 2023-01-27 DIAGNOSIS — O20.9 BLEEDING IN EARLY PREGNANCY: Primary | ICD-10-CM

## 2023-01-27 DIAGNOSIS — O20.9 BLEEDING IN EARLY PREGNANCY: ICD-10-CM

## 2023-01-27 LAB
ORGANISM: ABNORMAL
URINE CULTURE, ROUTINE: ABNORMAL

## 2023-01-28 LAB
A/G RATIO: 1.3 (ref 1.1–2.2)
ALBUMIN SERPL-MCNC: 4.3 G/DL (ref 3.4–5)
ALP BLD-CCNC: 59 U/L (ref 40–129)
ALT SERPL-CCNC: 13 U/L (ref 10–40)
ANION GAP SERPL CALCULATED.3IONS-SCNC: 12 MMOL/L (ref 3–16)
AST SERPL-CCNC: 17 U/L (ref 15–37)
BILIRUB SERPL-MCNC: <0.2 MG/DL (ref 0–1)
BUN BLDV-MCNC: 8 MG/DL (ref 7–20)
CALCIUM SERPL-MCNC: 9.4 MG/DL (ref 8.3–10.6)
CHLORIDE BLD-SCNC: 101 MMOL/L (ref 99–110)
CO2: 24 MMOL/L (ref 21–32)
CREAT SERPL-MCNC: 0.7 MG/DL (ref 0.6–1.1)
GFR SERPL CREATININE-BSD FRML MDRD: >60 ML/MIN/{1.73_M2}
GLUCOSE BLD-MCNC: 74 MG/DL (ref 70–99)
GONADOTROPIN, CHORIONIC (HCG) QUANT: 3952 MIU/ML
HCT VFR BLD CALC: 39.8 % (ref 36–48)
HEMOGLOBIN: 13 G/DL (ref 12–16)
MCH RBC QN AUTO: 29 PG (ref 26–34)
MCHC RBC AUTO-ENTMCNC: 32.6 G/DL (ref 31–36)
MCV RBC AUTO: 89 FL (ref 80–100)
PDW BLD-RTO: 14.3 % (ref 12.4–15.4)
PLATELET # BLD: 224 K/UL (ref 135–450)
PMV BLD AUTO: 9 FL (ref 5–10.5)
POTASSIUM SERPL-SCNC: 4.7 MMOL/L (ref 3.5–5.1)
PROGESTERONE LEVEL: 4.17 NG/ML
RBC # BLD: 4.47 M/UL (ref 4–5.2)
SODIUM BLD-SCNC: 137 MMOL/L (ref 136–145)
TOTAL PROTEIN: 7.5 G/DL (ref 6.4–8.2)
WBC # BLD: 4.7 K/UL (ref 4–11)

## 2023-01-29 ASSESSMENT — ENCOUNTER SYMPTOMS
ALLERGIC/IMMUNOLOGIC NEGATIVE: 1
EYES NEGATIVE: 1
GASTROINTESTINAL NEGATIVE: 1
RESPIRATORY NEGATIVE: 1

## 2023-01-30 ENCOUNTER — HOSPITAL ENCOUNTER (OUTPATIENT)
Dept: ULTRASOUND IMAGING | Age: 28
Discharge: HOME | End: 2023-01-30
Payer: COMMERCIAL

## 2023-01-30 DIAGNOSIS — O20.9 BLEEDING IN EARLY PREGNANCY: ICD-10-CM

## 2023-01-30 PROCEDURE — 76817 TRANSVAGINAL US OBSTETRIC: CPT

## 2023-01-30 NOTE — PROGRESS NOTES
Pt called for refill Subjective:      Patient ID: Emanuel Nelson is a 25 y.o. female. Patient is here for follow-up from ER for vaginal bleeding and pregnancy. Patient had some cramping but feeling better. Review of Systems   Constitutional: Negative. HENT: Negative. Eyes: Negative. Respiratory: Negative. Cardiovascular: Negative. Gastrointestinal: Negative. Endocrine: Negative. Genitourinary: Negative. Musculoskeletal: Negative. Skin: Negative. Allergic/Immunologic: Negative. Neurological: Negative. Hematological: Negative. Psychiatric/Behavioral: Negative. Date of Birth 1995  History reviewed. No pertinent past medical history.   Past Surgical History:   Procedure Laterality Date    WISDOM TOOTH EXTRACTION Bilateral      OB History    Para Term  AB Living   2 1           SAB IAB Ectopic Molar Multiple Live Births                    # Outcome Date GA Lbr Kunal/2nd Weight Sex Delivery Anes PTL Lv   2 Current            1 Para      Vag-Spont        Social History     Socioeconomic History    Marital status: Single     Spouse name: Not on file    Number of children: Not on file    Years of education: Not on file    Highest education level: Not on file   Occupational History    Not on file   Tobacco Use    Smoking status: Never    Smokeless tobacco: Never   Substance and Sexual Activity    Alcohol use: Not Currently    Drug use: Not Currently    Sexual activity: Not on file   Other Topics Concern    Not on file   Social History Narrative    Not on file     Social Determinants of Health     Financial Resource Strain: Not on file   Food Insecurity: Not on file   Transportation Needs: Not on file   Physical Activity: Not on file   Stress: Not on file   Social Connections: Not on file   Intimate Partner Violence: Not on file   Housing Stability: Not on file     No Known Allergies  Outpatient Medications Marked as Taking for the 23 encounter (Office Visit) with Karen Schultz MD   Medication Sig Dispense Refill    [] metroNIDAZOLE (FLAGYL) 500 MG tablet Take 1 tablet by mouth 2 times daily for 7 days 14 tablet 0    dicyclomine (BENTYL) 10 MG capsule Take 1 capsule by mouth every 6 hours as needed (cramps) 20 capsule 0     History reviewed. No pertinent family history. BP (!) 91/57 (Site: Right Upper Arm, Position: Sitting, Cuff Size: Large Adult)   Pulse 88   Resp 17   Ht 5' 5\" (1.651 m)   Wt 145 lb 3.2 oz (65.9 kg)   LMP 2022   SpO2 100%   BMI 24.16 kg/m²       Objective:   Physical Exam  Constitutional:       General: She is not in acute distress. Appearance: She is well-developed. HENT:      Head: Normocephalic and atraumatic. Eyes:      Extraocular Movements: Extraocular movements intact. Neck:      Thyroid: No thyromegaly. Cardiovascular:      Heart sounds: No friction rub. Pulmonary:      Effort: Pulmonary effort is normal.   Abdominal:      General: There is no distension. Palpations: Abdomen is soft. There is no mass. Tenderness: There is no abdominal tenderness. There is no guarding or rebound. Musculoskeletal:         General: No tenderness. Normal range of motion. Cervical back: Normal range of motion and neck supple. Lymphadenopathy:      Cervical: No cervical adenopathy. Skin:     General: Skin is warm and dry. Neurological:      Mental Status: She is alert and oriented to person, place, and time. Psychiatric:         Behavior: Behavior normal.       Assessment:      Bleeding in pregnancy      Plan:      [facility] has been going up but too early to see anything on US. Check levels. Progesterone a little low but does have normal appearing corpus luteum cyst.    Will have patient repeat OB transvaginal US next week. Understands could be healthy pregnancy, miscarriage or even ectopic. All warning signs given. Abdomen benign today. Pelvic rest. Follow quant. RH positive.          Karen Schultz MD

## 2023-01-31 DIAGNOSIS — O20.9 BLEEDING IN EARLY PREGNANCY: ICD-10-CM

## 2023-01-31 DIAGNOSIS — O20.9 BLEEDING IN EARLY PREGNANCY: Primary | ICD-10-CM

## 2023-01-31 LAB
GONADOTROPIN, CHORIONIC (HCG) QUANT: 6182 MIU/ML
HCT VFR BLD CALC: 37.6 % (ref 36–48)
HEMOGLOBIN: 11.7 G/DL (ref 12–16)
MCH RBC QN AUTO: 28.3 PG (ref 26–34)
MCHC RBC AUTO-ENTMCNC: 31.2 G/DL (ref 31–36)
MCV RBC AUTO: 90.6 FL (ref 80–100)
PDW BLD-RTO: 14.4 % (ref 12.4–15.4)
PLATELET # BLD: 239 K/UL (ref 135–450)
PMV BLD AUTO: 9 FL (ref 5–10.5)
PROGESTERONE LEVEL: 4.49 NG/ML
RBC # BLD: 4.15 M/UL (ref 4–5.2)
WBC # BLD: 5 K/UL (ref 4–11)

## 2023-02-01 ENCOUNTER — HOSPITAL ENCOUNTER (OUTPATIENT)
Dept: INFUSION THERAPY | Age: 28
Setting detail: INFUSION SERIES
Discharge: HOME | End: 2023-02-01
Payer: COMMERCIAL

## 2023-02-01 ENCOUNTER — HOSPITAL ENCOUNTER (OUTPATIENT)
Dept: ULTRASOUND IMAGING | Age: 28
Discharge: HOME | End: 2023-02-01
Payer: COMMERCIAL

## 2023-02-01 VITALS
HEART RATE: 90 BPM | DIASTOLIC BLOOD PRESSURE: 73 MMHG | RESPIRATION RATE: 18 BRPM | SYSTOLIC BLOOD PRESSURE: 107 MMHG | TEMPERATURE: 98.7 F | OXYGEN SATURATION: 96 %

## 2023-02-01 DIAGNOSIS — O20.9 BLEEDING IN EARLY PREGNANCY: Primary | ICD-10-CM

## 2023-02-01 DIAGNOSIS — O20.9 BLEEDING IN EARLY PREGNANCY: ICD-10-CM

## 2023-02-01 PROCEDURE — 99214 OFFICE O/P EST MOD 30 MIN: CPT | Performed by: OBSTETRICS & GYNECOLOGY

## 2023-02-01 PROCEDURE — 96372 THER/PROPH/DIAG INJ SC/IM: CPT

## 2023-02-01 PROCEDURE — 76817 TRANSVAGINAL US OBSTETRIC: CPT

## 2023-02-01 PROCEDURE — 6360000002 HC RX W HCPCS: Performed by: OBSTETRICS & GYNECOLOGY

## 2023-02-01 RX ORDER — METHOTREXATE 25 MG/ML
50 INJECTION INTRA-ARTERIAL; INTRAMUSCULAR; INTRATHECAL; INTRAVENOUS ONCE
Status: COMPLETED | OUTPATIENT
Start: 2023-02-01 | End: 2023-02-01

## 2023-02-01 RX ADMIN — METHOTREXATE 87.5 MG: 25 INJECTION INTRA-ARTERIAL; INTRAMUSCULAR; INTRATHECAL; INTRAVENOUS at 14:45

## 2023-02-01 NOTE — PROGRESS NOTES
Outpatient [address] [facility]    Methotrexate Injection Visit    NAME:  Ming Webb  YOB: 1995  MEDICAL RECORD NUMBER:  [id number]  DATE:  2023    Patient arrived to [facility]   [] per wheelchair   [x] ambulatory     Patient has been having early pregnancy vaginal bleeding. Seen in [facility] ER last week with some bleeding and abdominal cramping. Saw Dr. Herminio Boyd 3 days after that and no abd pain but still with some bleeding. At that time it was still too early to see anything on US. Progesterone levels were rising slowly. HCG level decreased yesterday. LMP 2022. US done today which shows ectopic pregnancy. Dr. Herminio Boyd is coming in to give patient injection of Methotrexate.  ( # of pregnancies ) 2    Parity ( # of births )  1    Gestational age:  5-6 weeks      /73   Pulse 90   Temp 98.7 °F (37.1 °C) (Oral)   Resp 18   LMP 2022   SpO2 96%       Patient crying and given some emotional support. Patient states she has a 8year old son and she and her fiance have been trying to get pregnant. States she is 25years old and wants to have a baby before she gets much older. Dr. Herminio oByd here and gave patient 2 injections of Methotrexate total of 87.5 mg divided in both left and right dorsogluteal muscle. Patient tolerated well. Dr. Herminio Boyd explained to patient she may have some bleeding and cramping over next few days but if she develops any severe bleeding or pain she should go to ER or call office. Patient is to see Dr. Herminio Boyd in the office on 23 and appt [facility] for 8:30 am.    Patient observed for 45 minutes after administration. Patient states she had a few seconds after injection that she felt her heart racing but did not last very long and pulse rate is in 80's presently. No further complaints. Patient given some written information about ectopic pregnancy and methotrexate drug.      Response to treatment:  Well tolerated by patient.       Electronically signed by Chandana Mckeon RN on 2/1/2023 at 5:36 PM

## 2023-02-01 NOTE — PROGRESS NOTES
Patient is a 25year old F who we have been following [facility]. Rising about 60% every 48 hours. Low progesterone. Now with ectopic seen on left adnexa. Patient with no pain today. Patient is having some bleeding but not heavy. Patient for MTX injection. Review of Systems: as above  General ROS: negative  Psychological ROS: negative  Ophthalmic ROS: negative  ENT ROS: negative  Allergy and Immunology ROS: negative  Hematological and Lymphatic ROS: negative  Endocrine ROS: negative  Breast ROS: negative  Respiratory ROS: negative  Cardiovascular ROS: negative  Gastrointestinal ROS: negative  Genito-Urinary ROS: as above  Musculoskeletal ROS: negative  Neurological ROS: negative  Dermatological ROS: negative     Date of Birth 1995  No past medical history on file.   Past Surgical History:   Procedure Laterality Date    WISDOM TOOTH EXTRACTION Bilateral      OB History    Para Term  AB Living   2 1           SAB IAB Ectopic Molar Multiple Live Births                    # Outcome Date GA Lbr Kunal/2nd Weight Sex Delivery Anes PTL Lv   2 Current            1 Para      Vag-Spont        Social History     Socioeconomic History    Marital status: Single     Spouse name: Not on file    Number of children: Not on file    Years of education: Not on file    Highest education level: Not on file   Occupational History    Not on file   Tobacco Use    Smoking status: Never    Smokeless tobacco: Never   Substance and Sexual Activity    Alcohol use: Not Currently    Drug use: Not Currently    Sexual activity: Not on file   Other Topics Concern    Not on file   Social History Narrative    Not on file     Social Determinants of Health     Financial Resource Strain: Not on file   Food Insecurity: Not on file   Transportation Needs: Not on file   Physical Activity: Not on file   Stress: Not on file   Social Connections: Not on file   Intimate Partner Violence: Not on file   Housing Stability: Not on file     No Known Allergies  No outpatient medications have been marked as taking for the 2/1/23 encounter [facility] Encounter) with [address]. No family history on file. LMP 12/13/2022     WDWN in NAD  A and O x 3  ABD-soft, NT, ND, no hsm  Ext-no c/c/e, no cords, NT    Lab Results   Component Value Date    WBC 5.0 01/31/2023    HGB 11.7 (L) 01/31/2023    HCT 37.6 01/31/2023    MCV 90.6 01/31/2023     01/31/2023       Lab Results   Component Value Date     01/27/2023    K 4.7 01/27/2023     01/27/2023    CO2 24 01/27/2023    BUN 8 01/27/2023    CREATININE 0.7 01/27/2023    GLUCOSE 74 01/27/2023    CALCIUM 9.4 01/27/2023    PROT 7.5 01/27/2023    LABALBU 4.3 01/27/2023    BILITOT <0.2 01/27/2023    ALKPHOS 59 01/27/2023    AST 17 01/27/2023    ALT 13 01/27/2023    LABGLOM >60 01/27/2023    GFRAA >60 07/13/2021    AGRATIO 1.3 01/27/2023    GLOB 4.1 07/13/2021       Bhcg 6182, prog 4.49      Plan-patient is a 25year old F  Left sided ectopic pregnancy. Size 1.5 cm. Low progesterone. Clinically stable. Benign exam. Will proceed with MTX. Discussed SE N/V. Also discussed that we need to follow-the pregnancy level until zero. We need a day 4 bhcg and a day 7 bhcg. Patient understands could fail and could need surgery. Knows to come to ER with worsening pain. I will give shot myself. Procedure-2/2/23 at 2:50 pm.   Injection MTX given in divided doses. Total dose 87.5 mg. Divided doses given in Right and left hip-IM injections.  No complications

## 2023-02-06 ENCOUNTER — OFFICE VISIT (OUTPATIENT)
Dept: GYNECOLOGY | Age: 28
End: 2023-02-06
Payer: COMMERCIAL

## 2023-02-06 VITALS
WEIGHT: 146.8 LBS | SYSTOLIC BLOOD PRESSURE: 107 MMHG | DIASTOLIC BLOOD PRESSURE: 61 MMHG | OXYGEN SATURATION: 99 % | RESPIRATION RATE: 17 BRPM | BODY MASS INDEX: 24.46 KG/M2 | HEIGHT: 65 IN | HEART RATE: 84 BPM

## 2023-02-06 DIAGNOSIS — O00.102 LEFT TUBAL PREGNANCY WITHOUT INTRAUTERINE PREGNANCY: Primary | ICD-10-CM

## 2023-02-06 PROCEDURE — G8427 DOCREV CUR MEDS BY ELIG CLIN: HCPCS | Performed by: OBSTETRICS & GYNECOLOGY

## 2023-02-06 PROCEDURE — G8484 FLU IMMUNIZE NO ADMIN: HCPCS | Performed by: OBSTETRICS & GYNECOLOGY

## 2023-02-06 PROCEDURE — G8420 CALC BMI NORM PARAMETERS: HCPCS | Performed by: OBSTETRICS & GYNECOLOGY

## 2023-02-06 PROCEDURE — 1036F TOBACCO NON-USER: CPT | Performed by: OBSTETRICS & GYNECOLOGY

## 2023-02-06 PROCEDURE — 99213 OFFICE O/P EST LOW 20 MIN: CPT | Performed by: OBSTETRICS & GYNECOLOGY

## 2023-02-06 ASSESSMENT — ENCOUNTER SYMPTOMS
RESPIRATORY NEGATIVE: 1
ALLERGIC/IMMUNOLOGIC NEGATIVE: 1
EYES NEGATIVE: 1
GASTROINTESTINAL NEGATIVE: 1

## 2023-02-06 NOTE — PROGRESS NOTES
[address] [facility],6Th Floor [facility] GYNECOLOGY  [address] [address]  Dept: [phone]  Dept Fax: [phone]  Loc: [phone]     2023    Ajit Beach (:  1995) is a 25 y.o. female, here for evaluation of the following medical concerns:    Patient is here for follow-up on methotrexate. Patient had some pain for about an hour Sat night. Feels like passed something. Feels much better. Review of Systems   Constitutional: Negative. HENT: Negative. Eyes: Negative. Respiratory: Negative. Cardiovascular: Negative. Gastrointestinal: Negative. Endocrine: Negative. Genitourinary:  Positive for menstrual problem and pelvic pain. Musculoskeletal: Negative. Skin: Negative. Allergic/Immunologic: Negative. Neurological: Negative. Hematological: Negative. Psychiatric/Behavioral: Negative. Date of Birth 1995  No past medical history on file.   Past Surgical History:   Procedure Laterality Date    WISDOM TOOTH EXTRACTION Bilateral      OB History    Para Term  AB Living   2 1           SAB IAB Ectopic Molar Multiple Live Births                    # Outcome Date GA Lbr Kunal/2nd Weight Sex Delivery Anes PTL Lv   2 Current            1 Para      Vag-Spont        Social History     Socioeconomic History    Marital status: Single     Spouse name: Not on file    Number of children: Not on file    Years of education: Not on file    Highest education level: Not on file   Occupational History    Not on file   Tobacco Use    Smoking status: Never    Smokeless tobacco: Never   Substance and Sexual Activity    Alcohol use: Not Currently    Drug use: Not Currently    Sexual activity: Not on file   Other Topics Concern    Not on file   Social History Narrative    Not on file     Social Determinants of Health     Financial Resource Strain: Not on file   Food Insecurity: Not on file Transportation Needs: Not on file   Physical Activity: Not on file   Stress: Not on file   Social Connections: Not on file   Intimate Partner Violence: Not on file   Housing Stability: Not on file     No Known Allergies  Outpatient Medications Marked as Taking for the 2/6/23 encounter (Office Visit) with Sil Lopez MD   Medication Sig Dispense Refill    dicyclomine (BENTYL) 10 MG capsule Take 1 capsule by mouth every 6 hours as needed (cramps) 20 capsule 0     No family history on file. /61 (Site: Right Upper Arm, Position: Sitting, Cuff Size: Medium Adult)   Pulse 84   Resp 17   Ht 5' 5\" (1.651 m)   Wt 146 lb 12.8 oz (66.6 kg)   LMP 12/13/2022   SpO2 99%   BMI 24.43 kg/m²       Prior to Visit Medications    Medication Sig Taking? Authorizing Provider   dicyclomine (BENTYL) 10 MG capsule Take 1 capsule by mouth every 6 hours as needed (cramps) Yes Melinda Chavis MD   ibuprofen (IBU) 800 MG tablet Take 1 tablet by mouth every 8 hours as needed for Pain  Rosemary Thakur PA-C        No Known Allergies    No past medical history on file.     Past Surgical History:   Procedure Laterality Date    WISDOM TOOTH EXTRACTION Bilateral        Social History     Socioeconomic History    Marital status: Single     Spouse name: Not on file    Number of children: Not on file    Years of education: Not on file    Highest education level: Not on file   Occupational History    Not on file   Tobacco Use    Smoking status: Never    Smokeless tobacco: Never   Substance and Sexual Activity    Alcohol use: Not Currently    Drug use: Not Currently    Sexual activity: Not on file   Other Topics Concern    Not on file   Social History Narrative    Not on file     Social Determinants of Health     Financial Resource Strain: Not on file   Food Insecurity: Not on file   Transportation Needs: Not on file   Physical Activity: Not on file   Stress: Not on file   Social Connections: Not on file   Intimate Partner Violence: Not on file   Housing Stability: Not on file        No family history on file. Vitals:    02/06/23 0827   BP: 107/61   Site: Right Upper Arm   Position: Sitting   Cuff Size: Medium Adult   Pulse: 84   Resp: 17   SpO2: 99%   Weight: 146 lb 12.8 oz (66.6 kg)   Height: 5' 5\" (1.651 m)     Estimated body mass index is 24.43 kg/m² as calculated from the following:    Height as of this encounter: 5' 5\" (1.651 m). Weight as of this encounter: 146 lb 12.8 oz (66.6 kg). Physical Exam  Constitutional:       General: She is not in acute distress. Appearance: She is well-developed. HENT:      Head: Normocephalic and atraumatic. Eyes:      Extraocular Movements: Extraocular movements intact. Neck:      Thyroid: No thyromegaly. Pulmonary:      Effort: Pulmonary effort is normal.   Abdominal:      General: There is no distension. Palpations: Abdomen is soft. There is no mass. Tenderness: There is no abdominal tenderness. There is no guarding or rebound. Musculoskeletal:         General: No tenderness. Normal range of motion. Cervical back: Normal range of motion. Lymphadenopathy:      Cervical: No cervical adenopathy. Skin:     General: Skin is warm and dry. Neurological:      Mental Status: She is alert and oriented to person, place, and time. Psychiatric:         Behavior: Behavior normal.       ASSESSMENT/PLAN:  1. Left tubal pregnancy without intrauterine pregnancy  -Patient feeling better. No acute abdomen.   - CBC; Future  - HCG, QUANTITATIVE, PREGNANCY; Future  - Progesterone; Future  - Comprehensive Metabolic Panel; Future  -will need another level in a few days. -patient understands that level needs to decrease by 15%. Patient to stay off work today.   -plans per results. 2/7/23-called and spoke with patient. Told her I did not see that she got her blood drawn. Patient was supposed to get yesterday. Patient states she could not go yesterday.  Patient states she went today. Told her she was supposed to go yesterday. Told her we will call her with results tomorrow.

## 2023-02-06 NOTE — LETTER
[address] [place] [address]  Phone: [phone]  Fax: [phone]    Marlee Rivera MD        February 6, 2023     Patient: Katalina Lopes   YOB: 1995   Date of Visit: 2/6/2023       To Whom It May Concern: It is my medical opinion that Katalina Lopes should be excused from work today. Patient may return to work on 2/7. If you have any questions or concerns, please don't hesitate to call.     Sincerely,        Marlee Rivera MD

## 2023-02-07 ENCOUNTER — TELEPHONE (OUTPATIENT)
Dept: GYNECOLOGY | Age: 28
End: 2023-02-07

## 2023-02-07 DIAGNOSIS — O00.102 LEFT TUBAL PREGNANCY WITHOUT INTRAUTERINE PREGNANCY: Primary | ICD-10-CM

## 2023-02-07 DIAGNOSIS — O00.102 LEFT TUBAL PREGNANCY WITHOUT INTRAUTERINE PREGNANCY: ICD-10-CM

## 2023-02-07 LAB
A/G RATIO: 1.3 (ref 1.1–2.2)
ALBUMIN SERPL-MCNC: 4 G/DL (ref 3.4–5)
ALP BLD-CCNC: 55 U/L (ref 40–129)
ALT SERPL-CCNC: 11 U/L (ref 10–40)
ANION GAP SERPL CALCULATED.3IONS-SCNC: 9 MMOL/L (ref 3–16)
AST SERPL-CCNC: 16 U/L (ref 15–37)
BILIRUB SERPL-MCNC: 0.3 MG/DL (ref 0–1)
BUN BLDV-MCNC: 8 MG/DL (ref 7–20)
CALCIUM SERPL-MCNC: 9.3 MG/DL (ref 8.3–10.6)
CHLORIDE BLD-SCNC: 102 MMOL/L (ref 99–110)
CO2: 24 MMOL/L (ref 21–32)
CREAT SERPL-MCNC: 0.7 MG/DL (ref 0.6–1.1)
GFR SERPL CREATININE-BSD FRML MDRD: >60 ML/MIN/{1.73_M2}
GLUCOSE BLD-MCNC: 86 MG/DL (ref 70–99)
GONADOTROPIN, CHORIONIC (HCG) QUANT: 8599 MIU/ML
HCT VFR BLD CALC: 35.1 % (ref 36–48)
HEMOGLOBIN: 11.4 G/DL (ref 12–16)
MCH RBC QN AUTO: 28.9 PG (ref 26–34)
MCHC RBC AUTO-ENTMCNC: 32.6 G/DL (ref 31–36)
MCV RBC AUTO: 88.7 FL (ref 80–100)
PDW BLD-RTO: 13.9 % (ref 12.4–15.4)
PLATELET # BLD: 240 K/UL (ref 135–450)
PMV BLD AUTO: 8.6 FL (ref 5–10.5)
POTASSIUM SERPL-SCNC: 4 MMOL/L (ref 3.5–5.1)
PROGESTERONE LEVEL: 3.99 NG/ML
RBC # BLD: 3.96 M/UL (ref 4–5.2)
SODIUM BLD-SCNC: 135 MMOL/L (ref 136–145)
TOTAL PROTEIN: 7.2 G/DL (ref 6.4–8.2)
WBC # BLD: 3.5 K/UL (ref 4–11)

## 2023-02-07 NOTE — TELEPHONE ENCOUNTER
Patient called in because she has seen her  results and wants to know the next step.  Patient can be reached at [phone]

## 2023-02-07 NOTE — TELEPHONE ENCOUNTER
Spoke with patient. BHCG 8599. Given rise in Bhcg-since only have day 6 bhcg, will give second shot of MTX. Discussed with patient. Had a little bit of pain last night but none now. CBC stable. Exam normal on 2/6/23. Will set up MTX for tomorrow 2/8/23 at [facility] at 12:00 pm. Discussed with patient and discussed with Dr. Harry Mckay.

## 2023-02-08 ENCOUNTER — OFFICE VISIT (OUTPATIENT)
Dept: GYNECOLOGY | Age: 28
End: 2023-02-08
Payer: COMMERCIAL

## 2023-02-08 ENCOUNTER — HOSPITAL ENCOUNTER (OUTPATIENT)
Dept: ONCOLOGY | Age: 28
Setting detail: INFUSION SERIES
Discharge: HOME | End: 2023-02-08
Payer: COMMERCIAL

## 2023-02-08 VITALS
SYSTOLIC BLOOD PRESSURE: 101 MMHG | RESPIRATION RATE: 17 BRPM | OXYGEN SATURATION: 98 % | HEART RATE: 91 BPM | DIASTOLIC BLOOD PRESSURE: 63 MMHG | TEMPERATURE: 97.4 F

## 2023-02-08 DIAGNOSIS — O00.102 LEFT TUBAL PREGNANCY WITHOUT INTRAUTERINE PREGNANCY: Primary | ICD-10-CM

## 2023-02-08 PROCEDURE — 96401 CHEMO ANTI-NEOPL SQ/IM: CPT

## 2023-02-08 PROCEDURE — 1036F TOBACCO NON-USER: CPT | Performed by: OBSTETRICS & GYNECOLOGY

## 2023-02-08 PROCEDURE — G8428 CUR MEDS NOT DOCUMENT: HCPCS | Performed by: OBSTETRICS & GYNECOLOGY

## 2023-02-08 PROCEDURE — 6360000002 HC RX W HCPCS: Performed by: OBSTETRICS & GYNECOLOGY

## 2023-02-08 PROCEDURE — G8420 CALC BMI NORM PARAMETERS: HCPCS | Performed by: OBSTETRICS & GYNECOLOGY

## 2023-02-08 PROCEDURE — G8484 FLU IMMUNIZE NO ADMIN: HCPCS | Performed by: OBSTETRICS & GYNECOLOGY

## 2023-02-08 PROCEDURE — 99214 OFFICE O/P EST MOD 30 MIN: CPT | Performed by: OBSTETRICS & GYNECOLOGY

## 2023-02-08 PROCEDURE — 99211 OFF/OP EST MAY X REQ PHY/QHP: CPT

## 2023-02-08 RX ORDER — METHOTREXATE 25 MG/ML
50 INJECTION, SOLUTION INTRA-ARTERIAL; INTRAMUSCULAR; INTRAVENOUS ONCE
Status: COMPLETED | OUTPATIENT
Start: 2023-02-08 | End: 2023-02-08

## 2023-02-08 RX ORDER — METHOTREXATE 25 MG/ML
50 INJECTION, SOLUTION INTRA-ARTERIAL; INTRAMUSCULAR; INTRAVENOUS ONCE
Status: DISCONTINUED | OUTPATIENT
Start: 2023-02-08 | End: 2023-02-08

## 2023-02-08 RX ADMIN — METHOTREXATE 87.5 MG: 25 SOLUTION INTRA-ARTERIAL; INTRAMUSCULAR; INTRATHECAL; INTRAVENOUS at 12:51

## 2023-02-08 NOTE — LETTER
[address] [facility] Gynecology  [address] [facility] [address]  Phone: [phone]  Fax: [phone]    Pedrito Brice MD    February 8, 2023     Patient: Giuseppe Petty   MR Number: [id number]   YOB: 1995   Date of Visit: 2/8/2023       Dear Yannick Leon:    Andrade was seen today at the infusion center and [facility]. for administration of her second methotrexate injection. She was instructed to get her beta hCG level repeated in 1 week and to follow-up with you.     Sincerely,      Pedrito Brice MD

## 2023-02-08 NOTE — PROGRESS NOTES
Chief complaint: Ectopic pregnancy    History of present illness: Meño Del Cid 25 y.o. female Patient's last menstrual period was 12/13/2022. Who presents with complaint of ectopic pregnancy    The patient has been recently diagnosed with a left ectopic pregnancy by Dr. Michelle Corral. Component      Latest Ref Rng & Units 2/7/2023 1/31/2023 1/27/2023 1/24/2023           8:24 AM  1:42 PM 11:36 AM  9:29 PM   hCG Quant      <5.0 mIU/mL 8599.0 6182.0 3952.0 2407.0     Component      Latest Ref Rng & Units 1/20/2023          10:31 AM   hCG Quant      <5.0 mIU/mL 1097.0     Component      Latest Ref Rng & Units 2/7/2023 1/31/2023 1/27/2023 1/24/2023           8:24 AM  1:42 PM 11:36 AM  9:29 PM   Progesterone      ng/mL 3.99 4.49 4.17 3.55     US 2/1/2023  FINDINGS:   Uterus: 7.7 x 4.6 x 5 cm       Gestational Sac(s):  None       Yolk Sac:  None       Fetal Pole:  None       Crown Rump Length:  Not measured       Fetal Heart Rate:  Not measured       Right ovary: 2.7 x 1.8 x 1.4 cm       Left ovary: 4 x 2.7 x 2.8 cm. There is a 1.4 x 1.5 cm heterogenous mass   within the left adnexal region with surrounding hyperemia. Impression   1.5 cm complex left adnexal mass highly suspicious for ectopic pregnancy. Results were discussed with Dr. Michelle Corral     She received methotrexate 2/1/2023    In light of her increasing beta-hCG level without intervening level to determine course, it is reasonable to proceed with second methotrexate injection. The patient is asymptomatic. She is not having any abdominal pelvic pain. She reports that she tolerated her first methotrexate injection without complication. Review of systems:  No complaints of symptoms involving:  Constitutional: negative for fever, chills. Eyes: No change in vision, double vision, or scotomata. HENT: No sore throat, ear pain or nasal congestion. Respiratory: No cough, shortness of breath or hemoptysis.   Cardiovascular: No chest pain, orthopnea, fainting. Skin: No pruritus or generalized rash. Neurologic: No focal weakness or sensory changes. Past medical history, past surgical history, allergies, family history, and social history are all updated in the electronic medical record and reviewed. No past medical history on file. Past Surgical History:   Procedure Laterality Date    WISDOM TOOTH EXTRACTION Bilateral      OB History          2    Para   1    Term                AB        Living             SAB        IAB        Ectopic        Molar        Multiple        Live Births                  Current Outpatient Medications on File Prior to Visit   Medication Sig Dispense Refill    ibuprofen (IBU) 800 MG tablet Take 1 tablet by mouth every 8 hours as needed for Pain 20 tablet 0    dicyclomine (BENTYL) 10 MG capsule Take 1 capsule by mouth every 6 hours as needed (cramps) 20 capsule 0     No current facility-administered medications on file prior to visit. Allergy: Patient has no known allergies. Social History     Tobacco Use    Smoking status: Never    Smokeless tobacco: Never   Substance Use Topics    Alcohol use: Not Currently     No family history on file.   Social History     Socioeconomic History    Marital status: Single     Spouse name: Not on file    Number of children: Not on file    Years of education: Not on file    Highest education level: Not on file   Occupational History    Not on file   Tobacco Use    Smoking status: Never    Smokeless tobacco: Never   Substance and Sexual Activity    Alcohol use: Not Currently    Drug use: Not Currently    Sexual activity: Not on file   Other Topics Concern    Not on file   Social History Narrative    Not on file     Social Determinants of Health     Financial Resource Strain: Not on file   Food Insecurity: Not on file   Transportation Needs: Not on file   Physical Activity: Not on file   Stress: Not on file   Social Connections: Not on file   Intimate Partner Violence: Not on file   Housing Stability: Not on file       Physical exam:  Patient's last menstrual period was 12/13/2022. Constitutional: alert, no acute distress, non-toxic, normal respiratory effort  Eyes: Sclera are clear and nonicteric. Noninjected. Lids are grossly normal.  Pupils are round equal.  Irises are grossly normal.  Mouth/ENT: Lips, teeth, gums grossly normal.  Psychiatric: Judgment and insight are intact. Mood and affect are appropriate, calm. Skin:  no lesions,no rashes  Neck: Symmetric without gross mass effect. Trachea midline. Respiratory: Normal respiratory effort. No accessory muscles used. Gastrointestinal/Abdominal: Abdomen soft, non-tender. No rebound or guarding.  No masses, no hepatosplenomegaly, no hernia    LABS:    B POS    Component      Latest Ref Rng & Units 2/7/2023 1/31/2023 1/27/2023 1/24/2023           8:24 AM  1:42 PM 11:36 AM  9:29 PM   WBC      4.0 - 11.0 K/uL 3.5 (L) 5.0 4.7 5.4   RBC      4.00 - 5.20 M/uL 3.96 (L) 4.15 4.47 3.94 (L)   Hemoglobin Quant      12.0 - 16.0 g/dL 11.4 (L) 11.7 (L) 13.0 11.7 (L)   Hematocrit      36.0 - 48.0 % 35.1 (L) 37.6 39.8 35.7 (L)   MCV      80.0 - 100.0 fL 88.7 90.6 89.0 90.7   MCH      26.0 - 34.0 pg 28.9 28.3 29.0 29.7   MCHC      31.0 - 36.0 g/dL 32.6 31.2 32.6 32.8   RDW      12.4 - 15.4 % 13.9 14.4 14.3 14.6   Platelet Count      136 - 450 K/uL 240 239 224 234   MPV      5.0 - 10.5 fL 8.6 9.0 9.0 8.0     Component      Latest Ref Rng & Units 2/7/2023 1/27/2023           8:24 AM 11:36 AM   Sodium      136 - 145 mmol/L 135 (L) 137   Potassium      3.5 - 5.1 mmol/L 4.0 4.7   Chloride      99 - 110 mmol/L 102 101   CO2      21 - 32 mmol/L 24 24   Anion Gap      3 - 16 9 12   Glucose, Random      70 - 99 mg/dL 86 74   BUN,BUNPL      7 - 20 mg/dL 8 8   Creatinine      0.6 - 1.1 mg/dL 0.7 0.7   Est, Glom Filt Rate      >60 >60 >60   CALCIUM, SERUM, 605706      8.3 - 10.6 mg/dL 9.3 9.4   Total Protein      6.4 - 8.2 g/dL 7.2 7.5   Albumin      3.4 - 5.0 g/dL 4.0 4.3   Albumin/Globulin Ratio      1.1 - 2.2 1.3 1.3   Bilirubin      0.0 - 1.0 mg/dL 0.3 <0.2   Alk Phos      40 - 129 U/L 55 59   ALT      10 - 40 U/L 11 13   AST      15 - 37 U/L 16 17          Diagnosis Orders   1. Left tubal pregnancy without intrauterine pregnancy            Differential diagnosis and management/testing options:  Left ectopic gestation. No evidence of rupture. Status post methotrexate 2/1/23    In light of her increasing beta-hCG level without intervening level to determine course, it is reasonable to proceed with second methotrexate injection. Testing: Repeat beta in 1 week    Medications: Methotrexate 50 mg per metered squared IM  Methotrexate 87.5 mg prepared by pharmacy and administered intramuscularly by myself in divided dose into both gluteus placido muscles. Patient tolerated well without adverse reaction. Risks & side effects, benefits, and alternatives of medications were discussed. Indications for medications are outlined in diagnoses. Benefits outweigh potential risk. Medications are prescribed as part of an overall monitoring / treatment plan with regular, scheduled follow up. The above medication counseling has been and will continue to be discussed with the patient, who agrees with the plan and voices understanding. Management options, where appropriate, were discussed. Diagnoses were discussed in detail; patient voiced understanding. Future direction:  If she does not improve with current management, further work-up or treatment may be necessary. Warnings and instructions were given. Her questions were answered. Daphne Stage voices understanding. Please note that some or all of this record was generated using voice recognition software. If there are any questions about the content of this document, please contact Dr. Javon Kiser as some errors in transcription may have occurred.

## 2023-02-08 NOTE — PROGRESS NOTES
Pt seen and assessed [facility] today for Methotrexate injection for left tubal pregnancy per orders from Dr. Edna Kaplan. Injection administered by Dr. Joanne Danielle and double checked by Juan Campbell RN. Pt tolerated infusion well and without incident. Pt verbalizes understanding of discharge instructions. Discharged stable and ambulatory to home with sister.     Marina Auguste RN

## 2023-02-14 DIAGNOSIS — O00.102 LEFT TUBAL PREGNANCY WITHOUT INTRAUTERINE PREGNANCY: Primary | ICD-10-CM

## 2023-02-15 ENCOUNTER — HOSPITAL ENCOUNTER (EMERGENCY)
Age: 28
Discharge: HOME | End: 2023-02-16
Attending: EMERGENCY MEDICINE
Payer: COMMERCIAL

## 2023-02-15 DIAGNOSIS — Z98.890 S/P LAPAROSCOPY: ICD-10-CM

## 2023-02-15 DIAGNOSIS — O00.102 RUPTURED LEFT TUBAL ECTOPIC PREGNANCY CAUSING HEMOPERITONEUM: Primary | ICD-10-CM

## 2023-02-15 DIAGNOSIS — K66.1 RUPTURED LEFT TUBAL ECTOPIC PREGNANCY CAUSING HEMOPERITONEUM: Primary | ICD-10-CM

## 2023-02-15 DIAGNOSIS — N83.209 RUPTURED CYST OF OVARY: ICD-10-CM

## 2023-02-15 PROCEDURE — 99284 EMERGENCY DEPT VISIT MOD MDM: CPT

## 2023-02-15 PROCEDURE — 96374 THER/PROPH/DIAG INJ IV PUSH: CPT

## 2023-02-15 PROCEDURE — 96376 TX/PRO/DX INJ SAME DRUG ADON: CPT

## 2023-02-15 RX ORDER — MORPHINE SULFATE 4 MG/ML
4 INJECTION, SOLUTION INTRAMUSCULAR; INTRAVENOUS ONCE
Status: COMPLETED | OUTPATIENT
Start: 2023-02-16 | End: 2023-02-16

## 2023-02-16 ENCOUNTER — APPOINTMENT (OUTPATIENT)
Dept: ULTRASOUND IMAGING | Age: 28
End: 2023-02-16
Payer: COMMERCIAL

## 2023-02-16 ENCOUNTER — ANESTHESIA (OUTPATIENT)
Dept: OPERATING ROOM | Age: 28
End: 2023-02-16
Payer: COMMERCIAL

## 2023-02-16 ENCOUNTER — ANESTHESIA EVENT (OUTPATIENT)
Dept: OPERATING ROOM | Age: 28
End: 2023-02-16
Payer: COMMERCIAL

## 2023-02-16 VITALS
DIASTOLIC BLOOD PRESSURE: 73 MMHG | RESPIRATION RATE: 14 BRPM | WEIGHT: 152 LBS | HEIGHT: 63 IN | BODY MASS INDEX: 26.93 KG/M2 | SYSTOLIC BLOOD PRESSURE: 107 MMHG | OXYGEN SATURATION: 100 % | TEMPERATURE: 97.7 F | HEART RATE: 66 BPM

## 2023-02-16 PROBLEM — R10.2 ACUTE PELVIC PAIN, FEMALE: Status: ACTIVE | Noted: 2023-02-16

## 2023-02-16 PROBLEM — N94.89 ADNEXAL MASS: Status: ACTIVE | Noted: 2023-02-16

## 2023-02-16 PROBLEM — Z87.59 HX OF ECTOPIC PREGNANCY: Status: ACTIVE | Noted: 2023-02-16

## 2023-02-16 LAB
ABO/RH: NORMAL
ALBUMIN SERPL-MCNC: 4.2 G/DL (ref 3.4–5)
ALP BLD-CCNC: 58 U/L (ref 40–129)
ALT SERPL-CCNC: 47 U/L (ref 10–40)
ANION GAP SERPL CALCULATED.3IONS-SCNC: 17 MMOL/L (ref 3–16)
ANTIBODY SCREEN: NORMAL
AST SERPL-CCNC: 45 U/L (ref 15–37)
BASOPHILS ABSOLUTE: 0 K/UL (ref 0–0.2)
BASOPHILS RELATIVE PERCENT: 0.4 %
BILIRUB SERPL-MCNC: <0.2 MG/DL (ref 0–1)
BILIRUBIN DIRECT: <0.2 MG/DL (ref 0–0.3)
BILIRUBIN, INDIRECT: ABNORMAL MG/DL (ref 0–1)
BUN BLDV-MCNC: 10 MG/DL (ref 7–20)
CALCIUM SERPL-MCNC: 9.3 MG/DL (ref 8.3–10.6)
CHLORIDE BLD-SCNC: 101 MMOL/L (ref 99–110)
CO2: 20 MMOL/L (ref 21–32)
CREAT SERPL-MCNC: 0.8 MG/DL (ref 0.6–1.1)
EOSINOPHILS ABSOLUTE: 0.2 K/UL (ref 0–0.6)
EOSINOPHILS RELATIVE PERCENT: 2.2 %
GFR SERPL CREATININE-BSD FRML MDRD: >60 ML/MIN/{1.73_M2}
GLUCOSE BLD-MCNC: 87 MG/DL (ref 70–99)
GONADOTROPIN, CHORIONIC (HCG) QUANT: <5 MIU/ML
HCT VFR BLD CALC: 37 % (ref 36–48)
HEMOGLOBIN: 12 G/DL (ref 12–16)
LYMPHOCYTES ABSOLUTE: 1.9 K/UL (ref 1–5.1)
LYMPHOCYTES RELATIVE PERCENT: 25.9 %
MCH RBC QN AUTO: 29.6 PG (ref 26–34)
MCHC RBC AUTO-ENTMCNC: 32.4 G/DL (ref 31–36)
MCV RBC AUTO: 91.4 FL (ref 80–100)
MONOCYTES ABSOLUTE: 0.5 K/UL (ref 0–1.3)
MONOCYTES RELATIVE PERCENT: 7.1 %
NEUTROPHILS ABSOLUTE: 4.7 K/UL (ref 1.7–7.7)
NEUTROPHILS RELATIVE PERCENT: 64.4 %
PDW BLD-RTO: 13.8 % (ref 12.4–15.4)
PLATELET # BLD: 262 K/UL (ref 135–450)
PMV BLD AUTO: 8.4 FL (ref 5–10.5)
POTASSIUM REFLEX MAGNESIUM: 4.1 MMOL/L (ref 3.5–5.1)
RBC # BLD: 4.05 M/UL (ref 4–5.2)
SODIUM BLD-SCNC: 138 MMOL/L (ref 136–145)
TOTAL PROTEIN: 8.1 G/DL (ref 6.4–8.2)
WBC # BLD: 7.3 K/UL (ref 4–11)

## 2023-02-16 PROCEDURE — 6360000002 HC RX W HCPCS: Performed by: STUDENT IN AN ORGANIZED HEALTH CARE EDUCATION/TRAINING PROGRAM

## 2023-02-16 PROCEDURE — 80076 HEPATIC FUNCTION PANEL: CPT

## 2023-02-16 PROCEDURE — 2580000003 HC RX 258: Performed by: ANESTHESIOLOGY

## 2023-02-16 PROCEDURE — 3700000000 HC ANESTHESIA ATTENDED CARE: Performed by: OBSTETRICS & GYNECOLOGY

## 2023-02-16 PROCEDURE — 80048 BASIC METABOLIC PNL TOTAL CA: CPT

## 2023-02-16 PROCEDURE — 2500000003 HC RX 250 WO HCPCS: Performed by: OBSTETRICS & GYNECOLOGY

## 2023-02-16 PROCEDURE — 2500000003 HC RX 250 WO HCPCS: Performed by: ANESTHESIOLOGY

## 2023-02-16 PROCEDURE — 3600000014 HC SURGERY LEVEL 4 ADDTL 15MIN: Performed by: OBSTETRICS & GYNECOLOGY

## 2023-02-16 PROCEDURE — 6360000002 HC RX W HCPCS: Performed by: ANESTHESIOLOGY

## 2023-02-16 PROCEDURE — 7100000010 HC PHASE II RECOVERY - FIRST 15 MIN: Performed by: OBSTETRICS & GYNECOLOGY

## 2023-02-16 PROCEDURE — 76801 OB US < 14 WKS SINGLE FETUS: CPT

## 2023-02-16 PROCEDURE — 84702 CHORIONIC GONADOTROPIN TEST: CPT

## 2023-02-16 PROCEDURE — 86900 BLOOD TYPING SEROLOGIC ABO: CPT

## 2023-02-16 PROCEDURE — 2720000010 HC SURG SUPPLY STERILE: Performed by: OBSTETRICS & GYNECOLOGY

## 2023-02-16 PROCEDURE — 7100000000 HC PACU RECOVERY - FIRST 15 MIN: Performed by: OBSTETRICS & GYNECOLOGY

## 2023-02-16 PROCEDURE — 2709999900 HC NON-CHARGEABLE SUPPLY: Performed by: OBSTETRICS & GYNECOLOGY

## 2023-02-16 PROCEDURE — 86901 BLOOD TYPING SEROLOGIC RH(D): CPT

## 2023-02-16 PROCEDURE — 85025 COMPLETE CBC W/AUTO DIFF WBC: CPT

## 2023-02-16 PROCEDURE — 3600000004 HC SURGERY LEVEL 4 BASE: Performed by: OBSTETRICS & GYNECOLOGY

## 2023-02-16 PROCEDURE — 7100000011 HC PHASE II RECOVERY - ADDTL 15 MIN: Performed by: OBSTETRICS & GYNECOLOGY

## 2023-02-16 PROCEDURE — 2580000003 HC RX 258: Performed by: OBSTETRICS & GYNECOLOGY

## 2023-02-16 PROCEDURE — 3700000001 HC ADD 15 MINUTES (ANESTHESIA): Performed by: OBSTETRICS & GYNECOLOGY

## 2023-02-16 PROCEDURE — 86850 RBC ANTIBODY SCREEN: CPT

## 2023-02-16 PROCEDURE — 7100000001 HC PACU RECOVERY - ADDTL 15 MIN: Performed by: OBSTETRICS & GYNECOLOGY

## 2023-02-16 RX ORDER — MIDAZOLAM HYDROCHLORIDE 1 MG/ML
INJECTION INTRAMUSCULAR; INTRAVENOUS PRN
Status: DISCONTINUED | OUTPATIENT
Start: 2023-02-16 | End: 2023-02-16 | Stop reason: SDUPTHER

## 2023-02-16 RX ORDER — SODIUM CHLORIDE 0.9 % (FLUSH) 0.9 %
5-40 SYRINGE (ML) INJECTION PRN
Status: DISCONTINUED | OUTPATIENT
Start: 2023-02-16 | End: 2023-02-16 | Stop reason: HOSPADM

## 2023-02-16 RX ORDER — ONDANSETRON 2 MG/ML
INJECTION INTRAMUSCULAR; INTRAVENOUS PRN
Status: DISCONTINUED | OUTPATIENT
Start: 2023-02-16 | End: 2023-02-16 | Stop reason: SDUPTHER

## 2023-02-16 RX ORDER — FENTANYL CITRATE 50 UG/ML
25 INJECTION, SOLUTION INTRAMUSCULAR; INTRAVENOUS EVERY 5 MIN PRN
Status: DISCONTINUED | OUTPATIENT
Start: 2023-02-16 | End: 2023-02-16 | Stop reason: HOSPADM

## 2023-02-16 RX ORDER — HYDRALAZINE HYDROCHLORIDE 20 MG/ML
10 INJECTION INTRAMUSCULAR; INTRAVENOUS
Status: DISCONTINUED | OUTPATIENT
Start: 2023-02-16 | End: 2023-02-16 | Stop reason: HOSPADM

## 2023-02-16 RX ORDER — OXYCODONE HYDROCHLORIDE AND ACETAMINOPHEN 5; 325 MG/1; MG/1
1-2 TABLET ORAL EVERY 4 HOURS PRN
Qty: 28 TABLET | Refills: 0 | Status: SHIPPED | OUTPATIENT
Start: 2023-02-16 | End: 2023-02-23

## 2023-02-16 RX ORDER — OXYCODONE HYDROCHLORIDE AND ACETAMINOPHEN 5; 325 MG/1; MG/1
1 TABLET ORAL
Status: DISCONTINUED | OUTPATIENT
Start: 2023-02-16 | End: 2023-02-16 | Stop reason: HOSPADM

## 2023-02-16 RX ORDER — FAMOTIDINE 10 MG/ML
INJECTION, SOLUTION INTRAVENOUS PRN
Status: DISCONTINUED | OUTPATIENT
Start: 2023-02-16 | End: 2023-02-16 | Stop reason: SDUPTHER

## 2023-02-16 RX ORDER — MORPHINE SULFATE 2 MG/ML
2 INJECTION, SOLUTION INTRAMUSCULAR; INTRAVENOUS ONCE
Status: COMPLETED | OUTPATIENT
Start: 2023-02-16 | End: 2023-02-16

## 2023-02-16 RX ORDER — SODIUM CHLORIDE 9 MG/ML
INJECTION, SOLUTION INTRAVENOUS PRN
Status: DISCONTINUED | OUTPATIENT
Start: 2023-02-16 | End: 2023-02-16 | Stop reason: HOSPADM

## 2023-02-16 RX ORDER — OXYCODONE HYDROCHLORIDE 5 MG/1
5 TABLET ORAL PRN
Status: DISCONTINUED | OUTPATIENT
Start: 2023-02-16 | End: 2023-02-16 | Stop reason: HOSPADM

## 2023-02-16 RX ORDER — ROCURONIUM BROMIDE 10 MG/ML
INJECTION, SOLUTION INTRAVENOUS PRN
Status: DISCONTINUED | OUTPATIENT
Start: 2023-02-16 | End: 2023-02-16 | Stop reason: SDUPTHER

## 2023-02-16 RX ORDER — FENTANYL CITRATE 50 UG/ML
INJECTION, SOLUTION INTRAMUSCULAR; INTRAVENOUS PRN
Status: DISCONTINUED | OUTPATIENT
Start: 2023-02-16 | End: 2023-02-16 | Stop reason: SDUPTHER

## 2023-02-16 RX ORDER — BUPIVACAINE HYDROCHLORIDE 2.5 MG/ML
INJECTION, SOLUTION EPIDURAL; INFILTRATION; INTRACAUDAL PRN
Status: DISCONTINUED | OUTPATIENT
Start: 2023-02-16 | End: 2023-02-16 | Stop reason: HOSPADM

## 2023-02-16 RX ORDER — SODIUM CHLORIDE 0.9 % (FLUSH) 0.9 %
5-40 SYRINGE (ML) INJECTION EVERY 12 HOURS SCHEDULED
Status: DISCONTINUED | OUTPATIENT
Start: 2023-02-16 | End: 2023-02-16 | Stop reason: HOSPADM

## 2023-02-16 RX ORDER — SODIUM CHLORIDE, SODIUM LACTATE, POTASSIUM CHLORIDE, AND CALCIUM CHLORIDE .6; .31; .03; .02 G/100ML; G/100ML; G/100ML; G/100ML
IRRIGANT IRRIGATION PRN
Status: DISCONTINUED | OUTPATIENT
Start: 2023-02-16 | End: 2023-02-16 | Stop reason: HOSPADM

## 2023-02-16 RX ORDER — SODIUM CHLORIDE, SODIUM LACTATE, POTASSIUM CHLORIDE, CALCIUM CHLORIDE 600; 310; 30; 20 MG/100ML; MG/100ML; MG/100ML; MG/100ML
INJECTION, SOLUTION INTRAVENOUS CONTINUOUS PRN
Status: DISCONTINUED | OUTPATIENT
Start: 2023-02-16 | End: 2023-02-16 | Stop reason: SDUPTHER

## 2023-02-16 RX ORDER — ONDANSETRON 2 MG/ML
4 INJECTION INTRAMUSCULAR; INTRAVENOUS
Status: DISCONTINUED | OUTPATIENT
Start: 2023-02-16 | End: 2023-02-16 | Stop reason: HOSPADM

## 2023-02-16 RX ORDER — LABETALOL HYDROCHLORIDE 5 MG/ML
10 INJECTION, SOLUTION INTRAVENOUS
Status: DISCONTINUED | OUTPATIENT
Start: 2023-02-16 | End: 2023-02-16 | Stop reason: HOSPADM

## 2023-02-16 RX ORDER — PROPOFOL 10 MG/ML
INJECTION, EMULSION INTRAVENOUS PRN
Status: DISCONTINUED | OUTPATIENT
Start: 2023-02-16 | End: 2023-02-16 | Stop reason: SDUPTHER

## 2023-02-16 RX ORDER — DEXAMETHASONE SODIUM PHOSPHATE 4 MG/ML
INJECTION, SOLUTION INTRA-ARTICULAR; INTRALESIONAL; INTRAMUSCULAR; INTRAVENOUS; SOFT TISSUE PRN
Status: DISCONTINUED | OUTPATIENT
Start: 2023-02-16 | End: 2023-02-16 | Stop reason: SDUPTHER

## 2023-02-16 RX ORDER — PROCHLORPERAZINE EDISYLATE 5 MG/ML
5 INJECTION INTRAMUSCULAR; INTRAVENOUS
Status: DISCONTINUED | OUTPATIENT
Start: 2023-02-16 | End: 2023-02-16 | Stop reason: HOSPADM

## 2023-02-16 RX ORDER — OXYCODONE HYDROCHLORIDE 5 MG/1
10 TABLET ORAL PRN
Status: DISCONTINUED | OUTPATIENT
Start: 2023-02-16 | End: 2023-02-16 | Stop reason: HOSPADM

## 2023-02-16 RX ADMIN — ONDANSETRON 4 MG: 2 INJECTION INTRAMUSCULAR; INTRAVENOUS at 06:55

## 2023-02-16 RX ADMIN — FAMOTIDINE 20 MG: 10 INJECTION, SOLUTION INTRAVENOUS at 07:18

## 2023-02-16 RX ADMIN — MIDAZOLAM HYDROCHLORIDE 2 MG: 2 INJECTION, SOLUTION INTRAMUSCULAR; INTRAVENOUS at 06:38

## 2023-02-16 RX ADMIN — ROCURONIUM BROMIDE 50 MG: 10 INJECTION INTRAVENOUS at 06:38

## 2023-02-16 RX ADMIN — DEXAMETHASONE SODIUM PHOSPHATE 4 MG: 4 INJECTION, SOLUTION INTRAMUSCULAR; INTRAVENOUS at 06:55

## 2023-02-16 RX ADMIN — SUGAMMADEX 400 MG: 100 INJECTION, SOLUTION INTRAVENOUS at 08:09

## 2023-02-16 RX ADMIN — FENTANYL CITRATE 25 MCG: 50 INJECTION, SOLUTION INTRAMUSCULAR; INTRAVENOUS at 08:59

## 2023-02-16 RX ADMIN — SODIUM CHLORIDE, SODIUM LACTATE, POTASSIUM CHLORIDE, AND CALCIUM CHLORIDE: .6; .31; .03; .02 INJECTION, SOLUTION INTRAVENOUS at 06:31

## 2023-02-16 RX ADMIN — FENTANYL CITRATE 100 MCG: 50 INJECTION, SOLUTION INTRAMUSCULAR; INTRAVENOUS at 06:38

## 2023-02-16 RX ADMIN — ROCURONIUM BROMIDE 20 MG: 10 INJECTION INTRAVENOUS at 07:56

## 2023-02-16 RX ADMIN — MORPHINE SULFATE 4 MG: 4 INJECTION INTRAVENOUS at 00:33

## 2023-02-16 RX ADMIN — PROPOFOL 150 MG: 10 INJECTION, EMULSION INTRAVENOUS at 06:38

## 2023-02-16 RX ADMIN — MORPHINE SULFATE 2 MG: 2 INJECTION, SOLUTION INTRAMUSCULAR; INTRAVENOUS at 03:02

## 2023-02-16 RX ADMIN — FENTANYL CITRATE 25 MCG: 50 INJECTION, SOLUTION INTRAMUSCULAR; INTRAVENOUS at 08:39

## 2023-02-16 ASSESSMENT — PAIN SCALES - GENERAL
PAINLEVEL_OUTOF10: 10
PAINLEVEL_OUTOF10: 5
PAINLEVEL_OUTOF10: 5
PAINLEVEL_OUTOF10: 10
PAINLEVEL_OUTOF10: 4
PAINLEVEL_OUTOF10: 0

## 2023-02-16 ASSESSMENT — ENCOUNTER SYMPTOMS
NAUSEA: 0
VOMITING: 0
SHORTNESS OF BREATH: 0
ABDOMINAL PAIN: 1

## 2023-02-16 ASSESSMENT — LIFESTYLE VARIABLES: SMOKING_STATUS: 0

## 2023-02-16 ASSESSMENT — PAIN DESCRIPTION - LOCATION
LOCATION: ABDOMEN

## 2023-02-16 ASSESSMENT — PAIN DESCRIPTION - PAIN TYPE: TYPE: SURGICAL PAIN

## 2023-02-16 ASSESSMENT — PAIN DESCRIPTION - DESCRIPTORS
DESCRIPTORS: ACHING
DESCRIPTORS: ACHING
DESCRIPTORS: CRAMPING

## 2023-02-16 ASSESSMENT — PAIN DESCRIPTION - ORIENTATION: ORIENTATION: ANTERIOR;LOWER

## 2023-02-16 NOTE — ED TRIAGE NOTES
Pt c/o L sided abd pain that radiates up and down side and to back. States she has been bleeding through 5 pads an hour. Known ectopic pregnancy.

## 2023-02-16 NOTE — PROGRESS NOTES
PACU Transfer to [facility] [place]    Procedure(s):  LAPAROSCOPY DIAGNOSTIC  LEFT LINEAR SALPINGOSTOMY    Pt's Current Allergies: Patient has no known allergies. Pt meets criteria to transfer to next phase of care per [place] [facility] and ASPAN standards    No results for input(s): POCGLU in the last 72 hours. Vitals:    02/16/23 0930   BP: 108/67   Pulse: 80   Resp: 18   Temp: 98.3 °F (36.8 °C)   SpO2: 99%      BP within 20% of pt's admitting BP as per Elias Score      Intake/Output Summary (Last 24 hours) at 2/16/2023 0949  Last data filed at 2/16/2023 9419  Gross per 24 hour   Intake 600 ml   Output 150 ml   Net 450 ml       Pain assessment:  present - adequately treated  Pain Level: 4    Patient was assessed for unknown alterations to skin integrity. There were not unknown alterations observed. Patient transferred to care of Tim Werner RN.    Family updated and directed to Tim Werner    2/16/2023 9:45 AM

## 2023-02-16 NOTE — ANESTHESIA POSTPROCEDURE EVALUATION
Department of Anesthesiology  Postprocedure Note    Patient: King Jatinder  MRN: [id number]  YOB: 1995  Date of evaluation: 2/16/2023      Procedure Summary     Date: 02/16/23 Room / Location: [address] [place] [address]    Anesthesia Start: 0631 Anesthesia Stop: 5935    Procedure: LAPAROSCOPY DIAGNOSTIC  LEFT LINEAR SALPINGOSTOMY (Left) Diagnosis:       Ruptured cyst of ovary      (POSSIBLE LEFT RUPTURED CYST VS/ POSSIBLE ECTOPIC PREGNANCY)    Surgeons: Ana Jose MD Responsible Provider: Columba Alvarez DO    Anesthesia Type: general ASA Status: 2 - Emergent          Anesthesia Type: No value filed.     Elias Phase I: Elias Score: 10    Elias Phase II: Elias Score: 10      Anesthesia Post Evaluation    Patient location during evaluation: PACU  Patient participation: complete - patient participated  Level of consciousness: awake and alert  Pain score: 0  Airway patency: patent  Nausea & Vomiting: no nausea and no vomiting  Complications: no  Cardiovascular status: hemodynamically stable  Respiratory status: acceptable  Hydration status: stable

## 2023-02-16 NOTE — BRIEF OP NOTE
Brief Postoperative Note      Patient: Emanuel Nelson  YOB: 1995  MRN: [id number]    Date of Procedure: 2/16/2023    Pre-Op Diagnosis: POSSIBLE LEFT RUPTURED CYST VS/ POSSIBLE ECTOPIC PREGNANCY    Post-Op Diagnosis:  left ectopic pregnancy, s/p MTX treatment       Procedure(s):  LAPAROSCOPY DIAGNOSTIC  LEFT LINEAR SALPINGOSTOMY    Surgeon(s):  Cruz Weiss MD    Assistant:  Surgical Assistant: Renu Fuller    Anesthesia: General    Estimated Blood Loss (mL): less than 100 ml, 100 ml in pelvis    Complications: None    Specimens:   ID Type Source Tests Collected by Time Destination   A : POSSIBLE ECTOPIC PREGNANCY Tissue Tissue SURGICAL PATHOLOGY Cruz Weiss MD 2/16/2023 4399        Implants:  * No implants in log *      Drains:   Urinary Catheter 02/16/23 [place] (Active)       Findings: dilated left fallopian tube. No evidence of rupture but very taught. Linear salpingostomy performed with some tissue removed. Bhcg was negative so not all tissue removed. Blunted fallopian tubes at ends of tubes, normal ovaries, adhesions on liver, nl uterus.      Electronically signed by Cruz Weiss MD on 2/16/2023 at 8:08 AM

## 2023-02-16 NOTE — PROGRESS NOTES
Ambulatory Surgery/Procedure Discharge Note    Vitals:    02/16/23 0956   BP: 107/73   Pulse: 66   Resp: 14   Temp: 97.7 °F (36.5 °C)   SpO2: 100%   Patient meets criteria for discharge per Elias score. In: 600 [I.V.:600]  Out: 150 [Urine:100]    Restroom use offered before discharge. Yes    Pain assessment:  present - adequately treated  Pain Level: 0    Pt and S.O./family states \"ready to go home\". Pt alert and oriented x4. IV removed. Denies N/V or pain. Dressing with steri-strips X3 C,D,I. Voided prior to discharge. Discharge instructions given to pt and friend Jaqui Richardson with pt permission. Pt and friend verbalized understanding of all instructions. Left with all belongings, X1 prescriptions, and discharge instructions. Patient discharged to home/self care.  Patient discharged via wheel chair by transporter to waiting family/S.O.       2/16/2023 11:02 AM

## 2023-02-16 NOTE — ANESTHESIA PRE PROCEDURE
Department of Anesthesiology  Preprocedure Note       Name:  Татьяна Magallanes   Age:  25 y.o.  :  1995                                          MRN:  [id number]         Date:  2023      Surgeon: Geoffrey Henderson):  Avelino Gordon MD    Procedure: Procedure(s):  LAPAROSCOPY DIAGNOSTIC POSSIBLE  LEFT SALPINGECTOMY, POSSIBLE LEFT OOPHERECTOMY, POSSIBLE LAPAROTOMY    Medications prior to admission:   Prior to Admission medications    Medication Sig Start Date End Date Taking?  Authorizing Provider   ibuprofen (IBU) 800 MG tablet Take 1 tablet by mouth every 8 hours as needed for Pain 22  Gonzalez Hylton PA-C   dicyclomine (BENTYL) 10 MG capsule Take 1 capsule by mouth every 6 hours as needed (cramps) 21   Elbert Greco MD       Current medications:    Current Facility-Administered Medications   Medication Dose Route Frequency Provider Last Rate Last Admin    bupivacaine (PF) (MARCAINE) 0.25 % injection    PRN Avelino Gordon MD   30 mL at 23 0532     Facility-Administered Medications Ordered in Other Encounters   Medication Dose Route Frequency Provider Last Rate Last Admin    fentaNYL (SUBLIMAZE) injection   IntraVENous PRN Leotis , DO   100 mcg at 23 2360    midazolam (VERSED) injection 2 mg/2mL   IntraVENous PRN Leotis , DO   2 mg at 23 5432    propofol injection   IntraVENous PRN Leotis , DO   150 mg at 23 4450    rocuronium (ZEMURON) injection   IntraVENous PRN Leotis , DO   50 mg at 23 5403    lactated ringers IV soln infusion   IntraVENous Continuous PRN Leotis , DO   New Bag at 23 0631    dexamethasone (DECADRON) injection 4 mg/mL   IntraVENous PRN Leotis , DO   4 mg at 23 0655    ondansetron (ZOFRAN) injection   IntraVENous PRN Leotis , DO   4 mg at 23 9565       Allergies:  No Known Allergies    Problem List: There is no problem list on file for this patient. Past Medical History:  History reviewed. No pertinent past medical history. Past Surgical History:        Procedure Laterality Date    WISDOM TOOTH EXTRACTION Bilateral        Social History:    Social History     Tobacco Use    Smoking status: Never    Smokeless tobacco: Never   Substance Use Topics    Alcohol use: Not Currently                                Counseling given: Not Answered      Vital Signs (Current):   Vitals:    02/15/23 2305   BP: 101/68   Pulse: 82   Resp: 16   Temp: 98.2 °F (36.8 °C)   TempSrc: Oral   SpO2: 100%   Weight: 152 lb (68.9 kg)   Height: 5' 3\" (1.6 m)                                              BP Readings from Last 3 Encounters:   02/15/23 101/68   02/08/23 101/63   02/06/23 107/61       NPO Status:                                                                                 BMI:   Wt Readings from Last 3 Encounters:   02/15/23 152 lb (68.9 kg)   02/06/23 146 lb 12.8 oz (66.6 kg)   01/27/23 145 lb 3.2 oz (65.9 kg)     Body mass index is 26.93 kg/m².     CBC:   Lab Results   Component Value Date/Time    WBC 7.3 02/16/2023 12:38 AM    RBC 4.05 02/16/2023 12:38 AM    HGB 12.0 02/16/2023 12:38 AM    HCT 37.0 02/16/2023 12:38 AM    MCV 91.4 02/16/2023 12:38 AM    RDW 13.8 02/16/2023 12:38 AM     02/16/2023 12:38 AM       CMP:   Lab Results   Component Value Date/Time     02/16/2023 12:38 AM    K 4.1 02/16/2023 12:38 AM     02/16/2023 12:38 AM    CO2 20 02/16/2023 12:38 AM    BUN 10 02/16/2023 12:38 AM    CREATININE 0.8 02/16/2023 12:38 AM    GFRAA >60 07/13/2021 10:25 AM    AGRATIO 1.3 02/07/2023 08:24 AM    LABGLOM >60 02/16/2023 12:38 AM    GLUCOSE 87 02/16/2023 12:38 AM    PROT 8.1 02/16/2023 12:38 AM    CALCIUM 9.3 02/16/2023 12:38 AM    BILITOT <0.2 02/16/2023 12:38 AM    ALKPHOS 58 02/16/2023 12:38 AM    AST 45 02/16/2023 12:38 AM    ALT 47 02/16/2023 12:38 AM       POC Tests: No results for input(s): POCGLU, POCNA, POCK, POCCL, POCBUN, POCHEMO, POCHCT in the last 72 hours. Coags: No results found for: PROTIME, INR, APTT    HCG (If Applicable):   Lab Results   Component Value Date    PREGTESTUR POSITIVE 01/20/2023        ABGs: No results found for: PHART, PO2ART, IUJ1FLD, ORV7APQ, BEART, J8MQDYQI     Type & Screen (If Applicable):  No results found for: LABABO, LABRH    Drug/Infectious Status (If Applicable):  No results found for: HIV, HEPCAB    COVID-19 Screening (If Applicable):   Lab Results   Component Value Date/Time    COVID19 DETECTED 11/09/2022 06:05 PM           Anesthesia Evaluation  Patient summary reviewed and Nursing notes reviewed no history of anesthetic complications:   Airway: Mallampati: I  TM distance: >3 FB   Neck ROM: full  Mouth opening: > = 3 FB   Dental: normal exam         Pulmonary: breath sounds clear to auscultation      (-) not a current smoker                           Cardiovascular:  Exercise tolerance: good (>4 METS),           Rhythm: regular  Rate: normal                    Neuro/Psych:   Negative Neuro/Psych ROS              GI/Hepatic/Renal:            ROS comment: +possible ectopic pregnancy. Endo/Other: Negative Endo/Other ROS                    Abdominal:             Vascular: negative vascular ROS. Other Findings:           Anesthesia Plan      general     ASA 2 - emergent       Induction: intravenous. MIPS: Prophylactic antiemetics administered. Anesthetic plan and risks discussed with patient. Plan discussed with CRNA.                     Mitchell Nicole,    2/16/2023

## 2023-02-16 NOTE — ED PROVIDER NOTES
[address] [facility] RESIDENT NOTE       Date of evaluation: 2/15/2023    Chief Complaint     Vaginal Bleeding (Known ectopic pregnancy since 1/16. Gotten two rounds of methotrexate and has been bleeding for 2 days )      History of Present Illness     Ever Joaquin is a 25 y.o. female who presents for left sided abdominal pain. Patient was diagnosed with an ectopic pregnancy in January. She has received two rounds of methotrexate, most recently on 2/8. She was supposed to have repeat bloodwork today but was unable to get it. She started having vaginal bleeding yesterday and went through at most 5 pads per hour. The bleeding has slowed down but she now has acute left sided abdominal pain. It is a sharp, stabbing sensation with radiation to her flank. MEDICAL DECISION MAKING / ASSESSMENT / PLAN     INITIAL VITALS: BP: 101/68, Temp: 98.2 °F (36.8 °C), Heart Rate: 82, Resp: 16, SpO2: 100 %    Ever Joaquin is a 25 y.o. female presenting for left abdominal pain and vaginal bleeding in setting of known left ectopic pregnancy s/p methotrexate. Vitals stable, afebrile. Physical exam concerning for left lower abdominal tenderness. Labs sent, patient given morphine for pain. Highest concern for ectopic pregnancy rupture. Bedside FAST exam negative. Gynecology consulted and recommends repeat ultrasound to characterize current state of left ectopic pregnancy and concern of rupture. CBC shows stable hemoglobin, BMP shows a mild anion gap, LFTs unremarkable. Radiology performed ultrasound shows ruptured left ectopic pregnancy, measuring 6.3 cm x 3.1 cm, with heterogenous material and concern for subacute hemorrhage. Gynecology (Dr. Kaitlin Peacock) recontacted and recommends taking patient to the OR. Patient updated on ruptured ectopic tubal pregnancy and need to go to OR for removal. Patient voices understanding.  Patient will be monitored in the ED until admission to Dr. Sivan Crowley to the OR. Medical Decision Making  Amount and/or Complexity of Data Reviewed  Labs: ordered. Radiology: ordered. Risk  Prescription drug management. This patient was also evaluated by the attending physician. All care plans werediscussed and agreed upon. Clinical Impression     1. Ruptured left tubal ectopic pregnancy causing hemoperitoneum        Disposition     PATIENT REFERRED TO:  No follow-up provider specified. DISCHARGE MEDICATIONS:  New Prescriptions    No medications on file       DISPOSITION Decision To Admit 02/16/2023 02:21:43 AM        Diagnostic Results and Other Data     RADIOLOGY:  US OB LESS THAN 14 WEEKS SINGLE OR FIRST GESTATION   Final Result      1. Suspected ruptured left tubal ectopic pregnancy.                    LABS:   Results for orders placed or performed during the hospital encounter of 02/15/23   CBC with Auto Differential   Result Value Ref Range    WBC 7.3 4.0 - 11.0 K/uL    RBC 4.05 4.00 - 5.20 M/uL    Hemoglobin 12.0 12.0 - 16.0 g/dL    Hematocrit 37.0 36.0 - 48.0 %    MCV 91.4 80.0 - 100.0 fL    MCH 29.6 26.0 - 34.0 pg    MCHC 32.4 31.0 - 36.0 g/dL    RDW 13.8 12.4 - 15.4 %    Platelets 517 180 - 127 K/uL    MPV 8.4 5.0 - 10.5 fL    Neutrophils % 64.4 %    Lymphocytes % 25.9 %    Monocytes % 7.1 %    Eosinophils % 2.2 %    Basophils % 0.4 %    Neutrophils Absolute 4.7 1.7 - 7.7 K/uL    Lymphocytes Absolute 1.9 1.0 - 5.1 K/uL    Monocytes Absolute 0.5 0.0 - 1.3 K/uL    Eosinophils Absolute 0.2 0.0 - 0.6 K/uL    Basophils Absolute 0.0 0.0 - 0.2 K/uL   BMP w/ Reflex to MG   Result Value Ref Range    Sodium 138 136 - 145 mmol/L    Potassium reflex Magnesium 4.1 3.5 - 5.1 mmol/L    Chloride 101 99 - 110 mmol/L    CO2 20 (L) 21 - 32 mmol/L    Anion Gap 17 (H) 3 - 16    Glucose 87 70 - 99 mg/dL    BUN 10 7 - 20 mg/dL    Creatinine 0.8 0.6 - 1.1 mg/dL    Est, Glom Filt Rate >60 >60    Calcium 9.3 8.3 - 10.6 mg/dL   HCG Serum, Quantitative   Result Value Ref Range    hCG Quant <5.0 <5.0 mIU/mL   Hepatic Function Panel   Result Value Ref Range    Total Protein 8.1 6.4 - 8.2 g/dL    Albumin 4.2 3.4 - 5.0 g/dL    Alkaline Phosphatase 58 40 - 129 U/L    ALT 47 (H) 10 - 40 U/L    AST 45 (H) 15 - 37 U/L    Total Bilirubin <0.2 0.0 - 1.0 mg/dL    Bilirubin, Direct <0.2 0.0 - 0.3 mg/dL    Bilirubin, Indirect see below 0.0 - 1.0 mg/dL       ED BEDSIDE ULTRASOUND:  No results found. RECENT VITALS:  BP: 101/68, Temp: 98.2 °F (36.8 °C), Heart Rate: 82,Resp: 16, SpO2: 100 %     Procedures     Bedside ultrasound  - FAST    ED Course     Nursing Notes, Past Medical Hx, Past Surgical Hx, Social Hx, Allergies, and Family Hx were reviewed. The patient was given the following medications:  Orders Placed This Encounter   Medications    morphine injection 4 mg    morphine (PF) injection 2 mg       CONSULTS:  IP CONSULT TO OB GYN  IP CONSULT TO OB GYN    Review of Systems     Review of Systems   Constitutional:  Negative for fever. Respiratory:  Negative for shortness of breath. Cardiovascular:  Negative for chest pain. Gastrointestinal:  Positive for abdominal pain. Negative for nausea and vomiting. Genitourinary:  Positive for vaginal bleeding. Musculoskeletal:  Negative for gait problem. Past Medical, Surgical, Family, and Social History     She has no past medical history on file. She has a past surgical history that includes [place] tooth extraction (Bilateral). Her family history is not on file. She reports that she has never smoked. She has never used smokeless tobacco. She reports that she does not currently use alcohol. She reports that she does not currently use drugs. Medications     Previous Medications    DICYCLOMINE (BENTYL) 10 MG CAPSULE    Take 1 capsule by mouth every 6 hours as needed (cramps)    IBUPROFEN (IBU) 800 MG TABLET    Take 1 tablet by mouth every 8 hours as needed for Pain       Allergies     She has No Known Allergies.     Physical Exam     INITIAL VITALS: BP: 101/68, Temp: 98.2 °F (36.8 °C), Heart Rate: 82, Resp: 16, SpO2: 100 %   Physical Exam  Exam conducted with a chaperone present. Constitutional:       General: She is not in acute distress. HENT:      Head: Normocephalic and atraumatic. Right Ear: External ear normal.      Left Ear: External ear normal.      Nose: Nose normal.      Mouth/Throat:      Mouth: Mucous membranes are moist.      Pharynx: Oropharynx is clear. Eyes:      Extraocular Movements: Extraocular movements intact. Conjunctiva/sclera: Conjunctivae normal.   Cardiovascular:      Rate and Rhythm: Normal rate and regular rhythm. Pulses: Normal pulses. Pulmonary:      Effort: Pulmonary effort is normal. No respiratory distress. Breath sounds: Normal breath sounds. Abdominal:      General: Abdomen is flat. There is no distension. Palpations: Abdomen is soft. Tenderness: There is abdominal tenderness (left lower quadrant). Genitourinary:     General: Normal vulva. Exam position: Supine. Vagina: Normal.      Comments: Cervix closed with old blood continuously leaking out of cervix. No lesions. Musculoskeletal:         General: No deformity. Cervical back: Normal range of motion and neck supple. Skin:     General: Skin is warm and dry. Capillary Refill: Capillary refill takes less than 2 seconds. Neurological:      General: No focal deficit present. Mental Status: She is alert and oriented to person, place, and time.    Psychiatric:         Mood and Affect: Mood normal.         Behavior: Behavior normal.              Osmin Arriaga MD  Resident  02/16/23 [id number]

## 2023-02-16 NOTE — ED NOTES
Pt to the OR at this time. Belongings packed into belongings bag and placed onto stretcher.       Ryland Snellen, RN  02/16/23 [id number]

## 2023-02-16 NOTE — ED PROVIDER NOTES
ED Attending Attestation Note     Date of evaluation: 2/15/2023    This patient was seen by the resident. I have seen and examined the patient, agree with the workup, evaluation, management and diagnosis. The care plan has been discussed. My assessment reveals patient with known history of ectopic pregnancy who has been treated by with methotrexate x2 presents complaining of vaginal bleeding abdominal pain. Patient reports vaginal bleeding over the last 2 days that was initially heavy and now has decreased. Patient does have tenderness to palpation of the lower abdomen, predominantly on the left side, with voluntary guarding present. Abdomen is otherwise nonperitoneal.  Will check laboratory studies, imaging, discuss with gynecology.      Reena Mendoza MD  02/16/23 2309 Patient called to schedule a pre-op exam for surgery on 5-24 with Dr. Escalera, please call, thanks.

## 2023-02-16 NOTE — PROGRESS NOTES
Pt arrived asleep no SS of pain report received from ROBERTA and Dr Levander Lennox warm blankets applied LAPAROSCOPY DIAGNOSTIC LEFT LINEAR SALPINGOSTOMY

## 2023-02-16 NOTE — H&P
Patient is a 25year old F A1 with known left ectopic pregnancy s/p 2 doses MTX. Patient had worsening pain 2/15/23-came to ER. Patient had [address] with some swelling in left adnexa. Actual BHCG is negative. HB 12. Given pain, swelling left adnexa and known ectopic pregnancy, needs DX l/s, possible left salpingectomy, possible oophorectomy, possible laparotomy. Review of Systems: as above  General ROS: negative  Psychological ROS: negative  Ophthalmic ROS: negative  ENT ROS: negative  Allergy and Immunology ROS: negative  Hematological and Lymphatic ROS: negative  Endocrine ROS: negative  Breast ROS: negative  Respiratory ROS: negative  Cardiovascular ROS: negative  Gastrointestinal ROS: negative  Genito-Urinary ROS: as above  Musculoskeletal ROS: negative  Neurological ROS: negative  Dermatological ROS: negative     Date of Birth 1995  History reviewed. No pertinent past medical history.   Past Surgical History:   Procedure Laterality Date    WISDOM TOOTH EXTRACTION Bilateral      OB History    Para Term  AB Living   2 1           SAB IAB Ectopic Molar Multiple Live Births                    # Outcome Date GA Lbr Kunal/2nd Weight Sex Delivery Anes PTL Lv   2 Current            1 Para      Vag-Spont        Social History     Socioeconomic History    Marital status: Single     Spouse name: Not on file    Number of children: Not on file    Years of education: Not on file    Highest education level: Not on file   Occupational History    Not on file   Tobacco Use    Smoking status: Never    Smokeless tobacco: Never   Substance and Sexual Activity    Alcohol use: Not Currently    Drug use: Not Currently    Sexual activity: Not on file   Other Topics Concern    Not on file   Social History Narrative    Not on file     Social Determinants of Health     Financial Resource Strain: Not on file   Food Insecurity: Not on file   Transportation Needs: Not on file   Physical Activity: Not on file   Stress: Not on file   Social Connections: Not on file   Intimate Partner Violence: Not on file   Housing Stability: Not on file     No Known Allergies  No outpatient medications have been marked as taking for the 2/15/23 encounter [facility] Encounter). History reviewed. No pertinent family history. /68   Pulse 82   Temp 98.2 °F (36.8 °C) (Oral)   Resp 16   Ht 5' 3\" (1.6 m)   Wt 152 lb (68.9 kg)   LMP 2022   SpO2 100%   Breastfeeding Unknown   BMI 26.93 kg/m²     WDWN in NAD  A and O x 3  ABD-soft, mildly tender, ND, no hsm  EXT-no c/c/e, no cords, ND  Skin-warm and dry  Neuro-intact    Lab Results   Component Value Date    WBC 7.3 2023    HGB 12.0 2023    HCT 37.0 2023    MCV 91.4 2023     2023       Lab Results   Component Value Date     2023    K 4.1 2023     2023    CO2 20 (L) 2023    BUN 10 2023    CREATININE 0.8 2023    GLUCOSE 87 2023    CALCIUM 9.3 2023    PROT 8.1 2023    LABALBU 4.2 2023    BILITOT <0.2 2023    ALKPHOS 58 2023    AST 45 (H) 2023    ALT 47 (H) 2023    LABGLOM >60 2023    GFRAA >60 2021    AGRATIO 1.3 2023    GLOB 4.1 2021         Plan-patient is a 25year old F A1 with known left ectopic pregnancy. S/p MTX x 2. Bhcg negative, swollen left adnexa. Patient with increased pain. For Dx L/S possible left salpingectomy, possible left oophorectomy, possible laparotomy. Discussed with patient possibility of saving tube if does not look ruptured given bhcg negative. Will need to see where bleeding is coming from-or if bleeding. All the risks, benefits alternatives discussed with patient including bleeding, blood transfusion, infection, damage to the bowel, bladder, other local organs with need for secondary surgery, anesthesia risks, blood clots in the lungs, legs, pelvis after surgery.  Patient understands these risks and agrees to the procedure. Patient also understands there may be other unforseen risks.

## 2023-02-19 NOTE — OP NOTE
[facility] [address]                                OPERATIVE REPORT    PATIENT NAME: Jennifer Do                    :        1995  MED REC NO:   [id number]                          ROOM:       A05  ACCOUNT NO:   [de-identified]                           ADMIT DATE: 02/15/2023  PROVIDER:     Zeenat Hills MD    DATE OF PROCEDURE:  2023    PREOPERATIVE DIAGNOSIS:  Left ectopic pregnancy, status post  methotrexate treatment x2. POSTOPERATIVE DIAGNOSIS:  Left ectopic pregnancy, status post  methotrexate treatment x2. PROCEDURES:  Diagnostic laparoscopy, linear left salpingostomy. SURGEON:  Zeenat Hills MD    ANESTHESIA:  General.    FINDINGS:  Dilated left fallopian tube with some less than 100 mL blood  in the pelvic cavity with no obvious sign of rupture; however, tube was  very dilated. ESTIMATED BLOOD LOSS:  100 mL. IV FLUIDS:  1000 mL. COMPLICATIONS:  None. Right fallopian tube blunted at end. Left fallopian tube and fimbria  are somewhat blunted. Normal ovaries, normal uterus. DISPOSITION:  The patient was taken to the recovery room in stable  condition. INDICATIONS:  The patient is a 33-year-old female who presents with  known left ectopic pregnancy, received MTX and is status post two treatments of  methotrexate. The patient actually had a negative beta hCG; however,  left fallopian tube appeared more dilated at 6 x 3 cm. The patient had  increased pain. Given the dilation of the fallopian tube, decision was  made to proceed with diagnostic laparoscopy, possible left  salpingectomy, possible left oophorectomy, possible laparotomy.   All the  risks, benefits, and alternatives were discussed with the patient  including risks of bleeding; blood transfusion; infection; damage to the  bowel, bladder or other local organs; need for secondary surgery; blood  clots in the lungs, legs, and calves after surgery; anesthesia risks;  need for laparotomy. The patient understands these risks and agrees to  the procedure. DESCRIPTION OF PROCEDURE:  The patient was taken to the operating room  where general anesthesia was found to be adequate. She was prepped and  draped in normal sterile fashion in dorsolithotomy position. Topete was  to straight drain. Pneumo boots were in place. Sponge stick was placed  in for uterine manipulation. A 5-mm infraumbilical incision was made with a scalpel after injection  of 0.25% Marcaine. A 5-mm Optiview trocar was placed. Pneumoperitoneum  was created. An 8-mm supraumbilical incision was made 2 cm above the  pubic bone and an 8-mm trocar was placed. The left lower quadrant  incision was made with a scalpel after injection of 0.25% Marcaine and a  5-mm trocar was placed. Care was made to avoid the epigastric vessels. Of note there was some less than 100 mL blood in the pelvis. The right  ovary appeared normal.  The right tube appeared blunted at the end. The  left fallopian tube was dilated. There was some small amount of fimbria  but did appear slightly blunted as well. Normal left ovary, normal  uterus. Given the beta hCG was negative, a decision was made to try to  attempt a linear salpingostomy. A small incision was made with Endo  Delon over the dilated portion of fallopian tube and some tissue was  removed. I cauterized around the edge of where this tissue was removed;  however, I was very careful not to clamp through the entire tube, so  there was just cautery done on the edge of where the opening was where  the tissue was removed. I felt I did not need to remove all the tissue  given that the beta hCG was negative, so hemostasis was achieved. Irrigation was performed multiple times. The tube was off much tension  and there was no active bleeding.     Surgiflo was  placed along this incision site and decision was made to end the procedure. All the trocars were removed under direct visualization. Pneumoperitoneum was released. Incisions were closed with 4-0 Monocryl  in subcuticular fashion. Steri-Strips and Mastisol were placed. The  patient tolerated the procedure well. Sponge stick was also removed  from the vagina. Topete was removed. All sponge, lap, needle counts  were correct x2.         Keyur Hannon MD    D: 02/18/2023 12:05:56       T: 02/18/2023 23:27:54     [email or web address]  Job#: [id number]     Doc#: [id number]    CC:

## 2023-03-02 ENCOUNTER — TELEPHONE (OUTPATIENT)
Dept: GYNECOLOGY | Age: 28
End: 2023-03-02

## 2023-03-02 NOTE — TELEPHONE ENCOUNTER
Patient would like to schedule her 2 week post op appt (surgery was 2/16/23). Please advise on scheduling.  Patient can be reached at [phone]

## 2023-03-06 ENCOUNTER — OFFICE VISIT (OUTPATIENT)
Dept: GYNECOLOGY | Age: 28
End: 2023-03-06

## 2023-03-06 VITALS
OXYGEN SATURATION: 98 % | SYSTOLIC BLOOD PRESSURE: 100 MMHG | HEART RATE: 91 BPM | DIASTOLIC BLOOD PRESSURE: 59 MMHG | BODY MASS INDEX: 24.22 KG/M2 | RESPIRATION RATE: 17 BRPM | HEIGHT: 65 IN | WEIGHT: 145.4 LBS

## 2023-03-06 DIAGNOSIS — Z87.59 HX OF ECTOPIC PREGNANCY: Primary | ICD-10-CM

## 2023-03-06 PROCEDURE — 99024 POSTOP FOLLOW-UP VISIT: CPT | Performed by: OBSTETRICS & GYNECOLOGY

## 2023-03-06 RX ORDER — FLUCONAZOLE 150 MG/1
150 TABLET ORAL ONCE
Qty: 2 TABLET | Refills: 0 | Status: SHIPPED | OUTPATIENT
Start: 2023-03-06 | End: 2023-03-06

## 2023-03-06 RX ORDER — VITAMIN A ACETATE, .BETA.-CAROTENE, ASCORBIC ACID, CHOLECALCIFEROL, .ALPHA.-TOCOPHEROL ACETATE, DL-, THIAMINE MONONITRATE, RIBOFLAVIN, NIACINAMIDE, PYRIDOXINE HYDROCHLORIDE, FOLIC ACID, CYANOCOBALAMIN, CALCIUM CARBONATE, FERROUS FUMARATE, ZINC OXIDE, AND CUPRIC OXIDE 2000; 2000; 120; 400; 22; 1.84; 3; 20; 10; 1; 12; 200; 27; 25; 2 [IU]/1; [IU]/1; MG/1; [IU]/1; MG/1; MG/1; MG/1; MG/1; MG/1; MG/1; UG/1; MG/1; MG/1; MG/1; MG/1
1 TABLET ORAL DAILY
Qty: 90 TABLET | Refills: 3 | Status: SHIPPED | OUTPATIENT
Start: 2023-03-06

## 2023-03-06 NOTE — PROGRESS NOTES
Patient is here for post op visit. Patient feeling well. BP (!) 100/59 (Site: Right Upper Arm, Position: Sitting, Cuff Size: Medium Adult)   Pulse 91   Resp 17   Ht 5' 5\" (1.651 m)   Wt 145 lb 6.4 oz (66 kg)   SpO2 98%   BMI 24.20 kg/m²     WDWN in NAD  A and O x 3  ABD-soft, NT, ND, incision cdi  EXT-no c/c/e, no cords, NT  Skin-warm and dry    Plan-s/p dx linear salpingostomy  -healing well  -bhcg was negative  -recommend hysterosalpingogram if can afford.  Will check on this  -wait 3 months until tries pregnancy   -complains of yeast-Diflucan

## 2023-03-29 ENCOUNTER — TELEPHONE (OUTPATIENT)
Dept: GYNECOLOGY | Age: 28
End: 2023-03-29

## 2023-03-29 NOTE — TELEPHONE ENCOUNTER
Patient called in to schedule her HYSTEROSALPINGOGRAM      Patient states that Dr Olivier Alarcon put an order for it and she wants to schedule it. I explained what putting an order in meanings. And that most likely the procedure will have to be done at another location.     Patient also requesting an order for STD Screening, HIV Screening, and anal papsmear       Patient can be reached at [phone]

## 2023-03-30 DIAGNOSIS — Z11.3 ROUTINE SCREENING FOR STI (SEXUALLY TRANSMITTED INFECTION): Primary | ICD-10-CM

## 2023-03-30 NOTE — TELEPHONE ENCOUNTER
Called and spoke to patient, scheduled her for her annual exam with Dr Andrew Macdonald, also placed order in system for STI screening.      Patient stated she will wait later to schedule the procedure hysterosalpingogram like in August.  I did explain to patient that this procedure is done at [facility] and what this procedure is

## 2024-05-13 ENCOUNTER — OFFICE VISIT (OUTPATIENT)
Dept: GYNECOLOGY | Age: 29
End: 2024-05-13
Payer: COMMERCIAL

## 2024-05-13 VITALS
HEART RATE: 85 BPM | SYSTOLIC BLOOD PRESSURE: 110 MMHG | RESPIRATION RATE: 17 BRPM | BODY MASS INDEX: 21.96 KG/M2 | WEIGHT: 131.8 LBS | HEIGHT: 65 IN | OXYGEN SATURATION: 98 % | DIASTOLIC BLOOD PRESSURE: 78 MMHG

## 2024-05-13 DIAGNOSIS — Z11.3 ROUTINE SCREENING FOR STI (SEXUALLY TRANSMITTED INFECTION): ICD-10-CM

## 2024-05-13 DIAGNOSIS — Z01.419 WELL WOMAN EXAM WITH ROUTINE GYNECOLOGICAL EXAM: Primary | ICD-10-CM

## 2024-05-13 DIAGNOSIS — R10.2 PELVIC CRAMPING: ICD-10-CM

## 2024-05-13 PROCEDURE — 99395 PREV VISIT EST AGE 18-39: CPT | Performed by: OBSTETRICS & GYNECOLOGY

## 2024-05-13 ASSESSMENT — ENCOUNTER SYMPTOMS
EYES NEGATIVE: 1
RESPIRATORY NEGATIVE: 1
GASTROINTESTINAL NEGATIVE: 1

## 2024-05-14 LAB
HBV SURFACE AG SERPL QL IA: NORMAL
HCV AB SERPL QL IA: NORMAL
HIV 1+2 AB+HIV1 P24 AG SERPL QL IA: NORMAL
HIV 2 AB SERPL QL IA: NORMAL
HIV1 AB SERPL QL IA: NORMAL
HIV1 P24 AG SERPL QL IA: NORMAL
REAGIN+T PALLIDUM IGG+IGM SERPL-IMP: NORMAL

## 2024-05-14 NOTE — PROGRESS NOTES
Subjective   Patient ID: Gene Bean is a 29 y.o. female.    Patient is here for annual. Patient had depression after ectopic. Feeling better now. Wants to know next steps with pregnancy. Wants sti screen.    Gynecologic Exam        Review of Systems   Constitutional: Negative.    HENT: Negative.     Eyes: Negative.    Respiratory: Negative.     Cardiovascular: Negative.    Gastrointestinal: Negative.    Endocrine: Negative.    Genitourinary: Negative.    Musculoskeletal: Negative.    Skin: Negative.    Allergic/Immunologic: Negative.    Neurological: Negative.    Hematological: Negative.    Psychiatric/Behavioral: Negative.       Date of Birth 1995  History reviewed. No pertinent past medical history.  Past Surgical History:   Procedure Laterality Date    LAPAROSCOPY Left 2023    LAPAROSCOPY DIAGNOSTIC  LEFT LINEAR SALPINGOSTOMY performed by Krysten Child MD at [facility]    WISDOM TOOTH EXTRACTION Bilateral      OB History    Para Term  AB Living   2 1     1     SAB IAB Ectopic Molar Multiple Live Births       1            # Outcome Date GA Lbr Kunal/2nd Weight Sex Delivery Anes PTL Lv   2 Para 12     Vag-Spont      1 Ectopic              Social History     Socioeconomic History    Marital status: Single     Spouse name: Not on file    Number of children: Not on file    Years of education: Not on file    Highest education level: Not on file   Occupational History    Not on file   Tobacco Use    Smoking status: Never    Smokeless tobacco: Never   Substance and Sexual Activity    Alcohol use: Not Currently    Drug use: Not Currently    Sexual activity: Not on file   Other Topics Concern    Not on file   Social History Narrative    Not on file     Social Determinants of Health     Financial Resource Strain: Not on file   Food Insecurity: Not on file   Transportation Needs: Not on file   Physical Activity: Not on file   Stress: Not on file   Social Connections: Not on file

## 2024-05-15 LAB
C TRACH DNA CVX QL NAA+PROBE: NEGATIVE
N GONORRHOEA DNA CERV MUCUS QL NAA+PROBE: NEGATIVE

## 2024-09-09 RX ORDER — FLUCONAZOLE 150 MG/1
150 TABLET ORAL ONCE
Qty: 1 TABLET | Refills: 1 | Status: SHIPPED | OUTPATIENT
Start: 2024-09-09 | End: 2024-09-09

## 2024-09-09 RX ORDER — VITAMIN A ACETATE, .BETA.-CAROTENE, ASCORBIC ACID, CHOLECALCIFEROL, .ALPHA.-TOCOPHEROL ACETATE, DL-, THIAMINE MONONITRATE, RIBOFLAVIN, NIACINAMIDE, PYRIDOXINE HYDROCHLORIDE, FOLIC ACID, CYANOCOBALAMIN, CALCIUM CARBONATE, FERROUS FUMARATE, ZINC OXIDE, AND CUPRIC OXIDE 2000; 2000; 120; 400; 22; 1.84; 3; 20; 10; 1; 12; 200; 27; 25; 2 [IU]/1; [IU]/1; MG/1; [IU]/1; MG/1; MG/1; MG/1; MG/1; MG/1; MG/1; UG/1; MG/1; MG/1; MG/1; MG/1
1 TABLET ORAL DAILY
Qty: 90 TABLET | Refills: 3 | Status: SHIPPED | OUTPATIENT
Start: 2024-09-09

## (undated) DEVICE — SEALER LAP L37CM MARYLAND JAW OPN NANO COAT MULTIFUNCTIONAL

## (undated) DEVICE — GENERAL LAPAROSCOPIC: Brand: MEDLINE INDUSTRIES, INC.

## (undated) DEVICE — TROCARS: Brand: KII® OPTICAL ACCESS SYSTEM

## (undated) DEVICE — MASTISOL ADHESIVE LIQ 2/3ML

## (undated) DEVICE — TROCAR: Brand: KII SHIELDED BLADED ACCESS SYSTEM

## (undated) DEVICE — GOWN,SIRUS,POLYRNF,BRTHSLV,XL,30/CS: Brand: MEDLINE

## (undated) DEVICE — Z DISCONTINUED NEEDLE HYPO 22GA L1.5IN BLK POLYPR HUB S STL REG BVL STR - SEE COMMENT

## (undated) DEVICE — STRIP,CLOSURE,WOUND,MEDI-STRIP,1/2X4: Brand: MEDLINE

## (undated) DEVICE — PACK LAP IV REINF TBL CVR ADH UTIL UNDERBUTTOCK DRP W CUF

## (undated) DEVICE — TOWEL,STOP FLAG GOLD N-W: Brand: MEDLINE

## (undated) DEVICE — GLOVE ORANGE PI 7   MSG9070

## (undated) DEVICE — PREMIUM WET SKIN PREP TRAY: Brand: MEDLINE INDUSTRIES, INC.

## (undated) DEVICE — SUTURE MCRYL SZ 4-0 L27IN ABSRB UD L19MM PS-2 1/2 CIR PRIM Y426H

## (undated) DEVICE — APPLICATOR MEDICATED 26 CC SOLUTION HI LT ORNG CHLORAPREP

## (undated) DEVICE — TROCAR: Brand: KII FIOS FIRST ENTRY

## (undated) DEVICE — KIT,ANTI FOG,W/SPONGE & FLUID,SOFT PACK: Brand: MEDLINE

## (undated) DEVICE — AGENT HEMOSTATIC SURGIFLOW MATRIX KIT W/THROMBIN

## (undated) DEVICE — PAD,NON-ADHERENT,3X8,STERILE,LF,1/PK: Brand: MEDLINE

## (undated) DEVICE — LAPAROSCOPIC SCISSORS: Brand: EPIX LAPAROSCOPIC SCISSORS

## (undated) DEVICE — MATERNITY PAD,HEAVY: Brand: CURITY

## (undated) DEVICE — TOTAL TRAY, 16FR 10ML SIL FOLEY, URN: Brand: MEDLINE

## (undated) DEVICE — CORD ES L10FT MPLR LAP

## (undated) DEVICE — GLOVE SURG SZ 65 THK91MIL LTX FREE SYN POLYISOPRENE